# Patient Record
Sex: FEMALE | Race: WHITE | NOT HISPANIC OR LATINO
[De-identification: names, ages, dates, MRNs, and addresses within clinical notes are randomized per-mention and may not be internally consistent; named-entity substitution may affect disease eponyms.]

---

## 2018-02-14 ENCOUNTER — APPOINTMENT (OUTPATIENT)
Dept: GASTROENTEROLOGY | Facility: CLINIC | Age: 68
End: 2018-02-14
Payer: MEDICARE

## 2018-02-14 VITALS
HEIGHT: 60 IN | SYSTOLIC BLOOD PRESSURE: 113 MMHG | RESPIRATION RATE: 16 BRPM | HEART RATE: 78 BPM | WEIGHT: 168 LBS | OXYGEN SATURATION: 97 % | TEMPERATURE: 97.9 F | BODY MASS INDEX: 32.98 KG/M2 | DIASTOLIC BLOOD PRESSURE: 75 MMHG

## 2018-02-14 DIAGNOSIS — Z78.9 OTHER SPECIFIED HEALTH STATUS: ICD-10-CM

## 2018-02-14 DIAGNOSIS — Z82.49 FAMILY HISTORY OF ISCHEMIC HEART DISEASE AND OTHER DISEASES OF THE CIRCULATORY SYSTEM: ICD-10-CM

## 2018-02-14 DIAGNOSIS — F15.90 OTHER STIMULANT USE, UNSPECIFIED, UNCOMPLICATED: ICD-10-CM

## 2018-02-14 DIAGNOSIS — K64.8 OTHER HEMORRHOIDS: ICD-10-CM

## 2018-02-14 DIAGNOSIS — K21.9 GASTRO-ESOPHAGEAL REFLUX DISEASE W/OUT ESOPHAGITIS: ICD-10-CM

## 2018-02-14 DIAGNOSIS — Z87.898 PERSONAL HISTORY OF OTHER SPECIFIED CONDITIONS: ICD-10-CM

## 2018-02-14 DIAGNOSIS — N20.0 CALCULUS OF KIDNEY: ICD-10-CM

## 2018-02-14 DIAGNOSIS — Z80.3 FAMILY HISTORY OF MALIGNANT NEOPLASM OF BREAST: ICD-10-CM

## 2018-02-14 PROCEDURE — 99215 OFFICE O/P EST HI 40 MIN: CPT

## 2018-04-15 PROBLEM — K21.9 GASTROESOPHAGEAL REFLUX DISEASE, ESOPHAGITIS PRESENCE NOT SPECIFIED: Noted: 2018-02-14

## 2018-04-15 PROBLEM — K21.9 GASTROESOPHAGEAL REFLUX DISEASE: Noted: 2018-02-14

## 2018-07-26 ENCOUNTER — RX RENEWAL (OUTPATIENT)
Age: 68
End: 2018-07-26

## 2018-09-05 ENCOUNTER — FORM ENCOUNTER (OUTPATIENT)
Age: 68
End: 2018-09-05

## 2018-09-06 ENCOUNTER — APPOINTMENT (OUTPATIENT)
Dept: ULTRASOUND IMAGING | Facility: CLINIC | Age: 68
End: 2018-09-06
Payer: MEDICARE

## 2018-09-06 ENCOUNTER — OUTPATIENT (OUTPATIENT)
Dept: OUTPATIENT SERVICES | Facility: HOSPITAL | Age: 68
LOS: 1 days | End: 2018-09-06
Payer: MEDICARE

## 2018-09-06 DIAGNOSIS — Z00.8 ENCOUNTER FOR OTHER GENERAL EXAMINATION: ICD-10-CM

## 2018-09-06 PROCEDURE — 76700 US EXAM ABDOM COMPLETE: CPT

## 2018-09-06 PROCEDURE — 76700 US EXAM ABDOM COMPLETE: CPT | Mod: 26

## 2018-10-11 ENCOUNTER — APPOINTMENT (OUTPATIENT)
Dept: GASTROENTEROLOGY | Facility: CLINIC | Age: 68
End: 2018-10-11
Payer: MEDICARE

## 2018-10-11 VITALS
RESPIRATION RATE: 14 BRPM | BODY MASS INDEX: 31.72 KG/M2 | HEART RATE: 85 BPM | WEIGHT: 168 LBS | DIASTOLIC BLOOD PRESSURE: 82 MMHG | HEIGHT: 61 IN | SYSTOLIC BLOOD PRESSURE: 125 MMHG | OXYGEN SATURATION: 96 %

## 2018-10-11 DIAGNOSIS — B18.2 CHRONIC VIRAL HEPATITIS C: ICD-10-CM

## 2018-10-11 DIAGNOSIS — Z87.39 PERSONAL HISTORY OF OTHER DISEASES OF THE MUSCULOSKELETAL SYSTEM AND CONNECTIVE TISSUE: ICD-10-CM

## 2018-10-11 DIAGNOSIS — R77.2 ABNORMALITY OF ALPHAFETOPROTEIN: ICD-10-CM

## 2018-10-11 DIAGNOSIS — K22.10 ULCER OF ESOPHAGUS W/OUT BLEEDING: ICD-10-CM

## 2018-10-11 DIAGNOSIS — Z86.010 PERSONAL HISTORY OF COLONIC POLYPS: ICD-10-CM

## 2018-10-11 DIAGNOSIS — K44.9 DIAPHRAGMATIC HERNIA W/OUT OBSTRUCTION OR GANGRENE: ICD-10-CM

## 2018-10-11 PROCEDURE — 99214 OFFICE O/P EST MOD 30 MIN: CPT

## 2018-10-11 RX ORDER — ASPIRIN 81 MG
81 TABLET,CHEWABLE ORAL
Refills: 0 | Status: ACTIVE | COMMUNITY

## 2018-10-11 RX ORDER — IBUPROFEN 200 MG
600 CAPSULE ORAL DAILY
Refills: 0 | Status: ACTIVE | COMMUNITY

## 2019-01-02 ENCOUNTER — APPOINTMENT (OUTPATIENT)
Dept: GASTROENTEROLOGY | Facility: CLINIC | Age: 69
End: 2019-01-02

## 2019-02-28 ENCOUNTER — APPOINTMENT (OUTPATIENT)
Dept: GASTROENTEROLOGY | Facility: CLINIC | Age: 69
End: 2019-02-28
Payer: MEDICARE

## 2019-02-28 VITALS
HEART RATE: 87 BPM | HEIGHT: 61 IN | WEIGHT: 168 LBS | BODY MASS INDEX: 31.72 KG/M2 | OXYGEN SATURATION: 95 % | TEMPERATURE: 97.9 F | DIASTOLIC BLOOD PRESSURE: 80 MMHG | SYSTOLIC BLOOD PRESSURE: 130 MMHG

## 2019-02-28 DIAGNOSIS — K46.9 UNSPECIFIED ABDOMINAL HERNIA W/OUT OBSTRUCTION OR GANGRENE: ICD-10-CM

## 2019-02-28 DIAGNOSIS — Z86.19 PERSONAL HISTORY OF OTHER INFECTIOUS AND PARASITIC DISEASES: ICD-10-CM

## 2019-02-28 PROCEDURE — 99204 OFFICE O/P NEW MOD 45 MIN: CPT

## 2019-02-28 NOTE — ASSESSMENT
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 Patient will continue to follow with hepatology group at [facility] and I will make further suggestions after above testing is complete the risks benefits alternatives and limitations of endoscopy were discussed

## 2019-04-10 ENCOUNTER — APPOINTMENT (OUTPATIENT)
Dept: GASTROENTEROLOGY | Facility: AMBULATORY MEDICAL SERVICES | Age: 69
End: 2019-04-10
Payer: MEDICARE

## 2019-04-10 PROCEDURE — 43239 EGD BIOPSY SINGLE/MULTIPLE: CPT

## 2019-05-03 ENCOUNTER — APPOINTMENT (OUTPATIENT)
Dept: HEPATOLOGY | Facility: CLINIC | Age: 69
End: 2019-05-03
Payer: MEDICARE

## 2019-05-03 VITALS
SYSTOLIC BLOOD PRESSURE: 135 MMHG | HEIGHT: 61 IN | BODY MASS INDEX: 31.53 KG/M2 | OXYGEN SATURATION: 96 % | WEIGHT: 167 LBS | HEART RATE: 84 BPM | TEMPERATURE: 98 F | DIASTOLIC BLOOD PRESSURE: 79 MMHG

## 2019-05-03 PROCEDURE — 99204 OFFICE O/P NEW MOD 45 MIN: CPT

## 2019-05-03 PROCEDURE — 99214 OFFICE O/P EST MOD 30 MIN: CPT

## 2019-05-03 NOTE — HISTORY OF PRESENT ILLNESS
[Needlestick Exposure] : no needlestick exposure [Infected Sexual Partner] : no infected sexual partner [Tattoo] : no tattoos [IV Drug Use] : no IV drug use [Body Piercing] : no body piercing [Hemodialysis] : no hemodialysis [Transfusion before 1992] : no transfusion before 1992 [Incarceration] : no incarceration [Transplant before 1992] : no transplant before 1992 [Alcohol Abuse] : no alcohol abuse [Autoimmune Disorder] : no autoimmune disorder [Household Contact to HBV] : no household contact to HBV [Travel to Endemic Area] : no travel to an endemic area [Occupational Exposure] : no occupational exposure [Cocaine Use] : no cocaine use [Moderate] : moderate in severity [Improving] : improving [Fatigue] : denies fatigue [Malaise] : denies malaise [Fever] : denies fever [Diffuse Joint Pain (Arthralgias)] : denies arthralgias [Muscle Aches, Generalized (Myalgias)] : denies myalgias [Yellow Skin Or Eyes (Jaundice)] : denies jaundice [Abdominal Pain] : denies abdominal pain [Urine Tests Nonspecific Abnormal Findings Biliuria] : denies dark urine [Light Colored Bowel Movement (Acholic Stools)] : denies pale stools [Insomnia] : denies insomnia [Skin: Rash] : denies rash [Shortness Of Breath] : denies shortness of breath [Itching (Pruritus)] : denies pruritus [Wt Gain ___ Lbs] : no recent weight gain [Wt Loss ___ Lbs] : no recent weight loss [de-identified] : This patient has history of hepatitis C infection diagnosed in 1996.  She was treated with interferon and then a second course of interferon and ribavirin and finally a third course of treatment with Sovaldi and Olysio.  This third course of treatment was successful in eliminating hepatitis C infection.  The patient has had 3 liver biopsies in 2006 2011 and 2013.  In 2006.  The patient had grade 1 stage II, histopathology and in 2011, grade one-to stage one-to.  The results from the biopsy and 2013 are not available.  The patient has had an abdominal MRI and 2013 showing mild increase in spleen size but no liver mass, lesion.  The patient has had an abdominal ultrasound in 2018, showing no focal liver lesion.  The patient is immune to hepatitis A, but is not immune to hepatitis B, but does believe that she has been vaccinated for hepatitis B without an effective response.\par \par The patient does not drink alcohol.  Her weight has been constant, although she is overweight.

## 2019-05-03 NOTE — ASSESSMENT
[FreeTextEntry1] : This patient has history of hepatitis C, which has reportedly been cured on treatment.  I will obtain repeat hepatitis C RNA and laboratory testing to assure absence of hepatitis C and also to assess potential fatty liver.  The patient will have an abdominal CT scan and fiber scan to assess liver morphology as well as assessing degree of hepatic fibrosis.  The patient is claustrophobic and does not wish to have MRI.  I will see the patient again in 6 months.

## 2019-05-03 NOTE — PHYSICAL EXAM
[Scleral Icterus] : No Scleral Icterus [Spider Angioma] : No spider angioma(s) were observed [Hepatojugular Reflux] : patient did not have a sustained hepatojugular reflux [Ascites Fluid Wave] : no ascites fluid wave [Abdominal Bruit] : no abdominal bruit [Abdominal  Ascites] : no ascites [Ascites Tense] : ascites is not tense [Ascites Shifting Dullness] : no shifting dullness of ascites [Caput Medusae] : no caput medusae observed [Liver Size (___ Cm)] : Liver size [unfilled] cm [Splenomegaly] : no splenomegaly [Liver Palpable ___ Finger Breadths Below Costal Margin] : Liver edge was palpable [unfilled] finger breadths below costal margin [Asterixis] : no asterixis observed [Palmar Erythema] : no Palmar Erythema [Jaundice] : No jaundice [General Appearance - Alert] : alert [General Appearance - In No Acute Distress] : in no acute distress [General Appearance - Well Developed] : well developed [Sclera] : the sclera and conjunctiva were normal [Outer Ear] : the ears and nose were normal in appearance [Examination Of The Oral Cavity] : the lips and gums were normal [Neck Appearance] : the appearance of the neck was normal [Neck Cervical Mass (___cm)] : no neck mass was observed [Jugular Venous Distention Increased] : there was no jugular-venous distention [Respiration, Rhythm And Depth] : normal respiratory rhythm and effort [Auscultation Breath Sounds / Voice Sounds] : lungs were clear to auscultation bilaterally [Exaggerated Use Of Accessory Muscles For Inspiration] : no accessory muscle use [Heart Sounds] : normal S1 and S2 [Heart Rate And Rhythm] : heart rate was normal and rhythm regular [Murmurs] : no murmurs [Edema] : there was no peripheral edema [Bowel Sounds] : normal bowel sounds [Abdomen Soft] : soft [Abdomen Tenderness] : non-tender [Abdomen Mass (___ Cm)] : no abdominal mass palpated [FreeTextEntry1] : mild hepatomegaly [Supraclavicular Lymph Nodes Enlarged Bilaterally] : supraclavicular [No CVA Tenderness] : no ~M costovertebral angle tenderness [Nail Clubbing] : no clubbing  or cyanosis of the fingernails [Skin Color & Pigmentation] : normal skin color and pigmentation [Involuntary Movements] : no involuntary movements were seen [No Focal Deficits] : no focal deficits [] : no rash [Skin Turgor] : normal skin turgor [Oriented To Time, Place, And Person] : oriented to person, place, and time

## 2019-05-05 ENCOUNTER — FORM ENCOUNTER (OUTPATIENT)
Age: 69
End: 2019-05-05

## 2019-05-06 ENCOUNTER — OUTPATIENT (OUTPATIENT)
Dept: OUTPATIENT SERVICES | Facility: HOSPITAL | Age: 69
LOS: 1 days | End: 2019-05-06
Payer: MEDICARE

## 2019-05-06 ENCOUNTER — APPOINTMENT (OUTPATIENT)
Dept: CT IMAGING | Facility: CLINIC | Age: 69
End: 2019-05-06
Payer: MEDICARE

## 2019-05-06 DIAGNOSIS — Z00.8 ENCOUNTER FOR OTHER GENERAL EXAMINATION: ICD-10-CM

## 2019-05-06 LAB
AFP-TM SERPL-MCNC: 5.1 NG/ML
ALBUMIN SERPL ELPH-MCNC: 4.8 G/DL
ALP BLD-CCNC: 93 U/L
ALT SERPL-CCNC: 18 U/L
ANION GAP SERPL CALC-SCNC: 15 MMOL/L
AST SERPL-CCNC: 29 U/L
BASOPHILS # BLD AUTO: 0.05 K/UL
BASOPHILS NFR BLD AUTO: 1.1 %
BILIRUB SERPL-MCNC: 0.2 MG/DL
BUN SERPL-MCNC: 25 MG/DL
CALCIUM SERPL-MCNC: 9.6 MG/DL
CHLORIDE SERPL-SCNC: 105 MMOL/L
CHOLEST SERPL-MCNC: 128 MG/DL
CHOLEST/HDLC SERPL: 2.3 RATIO
CO2 SERPL-SCNC: 22 MMOL/L
CREAT SERPL-MCNC: 0.66 MG/DL
EOSINOPHIL # BLD AUTO: 0.11 K/UL
EOSINOPHIL NFR BLD AUTO: 2.5 %
ESTIMATED AVERAGE GLUCOSE: 103 MG/DL
GLUCOSE SERPL-MCNC: 85 MG/DL
HBA1C MFR BLD HPLC: 5.2 %
HCT VFR BLD CALC: 34.6 %
HCV RNA SERPL NAA DL=5-ACNC: NOT DETECTED IU/ML
HCV RNA SERPL NAA+PROBE-LOG IU: NOT DETECTED LOGIU/ML
HDLC SERPL-MCNC: 57 MG/DL
HGB BLD-MCNC: 10.7 G/DL
IMM GRANULOCYTES NFR BLD AUTO: 0.2 %
INR PPP: 0.99 RATIO
LDLC SERPL CALC-MCNC: 57 MG/DL
LYMPHOCYTES # BLD AUTO: 1.47 K/UL
LYMPHOCYTES NFR BLD AUTO: 33.4 %
MAN DIFF?: NORMAL
MCHC RBC-ENTMCNC: 27.8 PG
MCHC RBC-ENTMCNC: 30.9 GM/DL
MCV RBC AUTO: 89.9 FL
MONOCYTES # BLD AUTO: 0.49 K/UL
MONOCYTES NFR BLD AUTO: 11.1 %
NEUTROPHILS # BLD AUTO: 2.27 K/UL
NEUTROPHILS NFR BLD AUTO: 51.7 %
PLATELET # BLD AUTO: 301 K/UL
POTASSIUM SERPL-SCNC: 5 MMOL/L
PROT SERPL-MCNC: 7.3 G/DL
PT BLD: 11.4 SEC
RBC # BLD: 3.85 M/UL
RBC # FLD: 14.9 %
SODIUM SERPL-SCNC: 142 MMOL/L
TRIGL SERPL-MCNC: 70 MG/DL
WBC # FLD AUTO: 4.4 K/UL

## 2019-05-06 PROCEDURE — 74170 CT ABD WO CNTRST FLWD CNTRST: CPT

## 2019-05-06 PROCEDURE — 82565 ASSAY OF CREATININE: CPT

## 2019-05-06 PROCEDURE — 74170 CT ABD WO CNTRST FLWD CNTRST: CPT | Mod: 26

## 2019-05-07 ENCOUNTER — NON-APPOINTMENT (OUTPATIENT)
Age: 69
End: 2019-05-07

## 2019-05-07 ENCOUNTER — APPOINTMENT (OUTPATIENT)
Dept: INTERNAL MEDICINE | Facility: CLINIC | Age: 69
End: 2019-05-07
Payer: MEDICARE

## 2019-05-07 VITALS
DIASTOLIC BLOOD PRESSURE: 82 MMHG | SYSTOLIC BLOOD PRESSURE: 128 MMHG | TEMPERATURE: 98.5 F | HEIGHT: 61 IN | RESPIRATION RATE: 16 BRPM | WEIGHT: 170.19 LBS | BODY MASS INDEX: 32.13 KG/M2 | HEART RATE: 74 BPM | OXYGEN SATURATION: 98 %

## 2019-05-07 DIAGNOSIS — Z82.49 FAMILY HISTORY OF ISCHEMIC HEART DISEASE AND OTHER DISEASES OF THE CIRCULATORY SYSTEM: ICD-10-CM

## 2019-05-07 PROCEDURE — G0439: CPT

## 2019-05-07 PROCEDURE — 93000 ELECTROCARDIOGRAM COMPLETE: CPT

## 2019-05-07 NOTE — PLAN
[FreeTextEntry1] : ENT\par cerumen, right ear-start Debrox\par Endocrinology\par hyperlipidemia-continue Atorvastatin 20mg p.o.q.d. - continue low cholesterol/low fat diet  \par hypertriglyceridemia-continue Omega 3 1000mg BID p.o.q.d.\par obesity-advised low carbohydrate diet/increased CV exercise\par Musculoskeletal\par osteopenia-continue Osteo Bi-Flex tablets p.o.q.d.\par continue Calcium 600mg BID p.o.q.d. \par Gastroenterology\par GERD-continue Dexilant 60mg p.o.q.d. - continue antireflux measures\par Cardiology \par hypertension-continue Losartan 25mg p.o.q.d. - continue low sodium diet\par continue Aspirin 81mg p.o.q.d.\par Rx given for Carotid doppler\par Hematology\par continue vitamin D3 2000 unit capsules BID p.o.q.d.\par Infectious Disease\par Pneumovax/Prevnar-discussed, patient to consider\par \par check EKG\par Patient to follow up in 3 months for fasting blood work. \par \par \par

## 2019-05-07 NOTE — ASSESSMENT
[FreeTextEntry1] : New patient is a 68 year year old female with a past medical history as above who presents for annual physical exam.\par

## 2019-05-07 NOTE — HISTORY OF PRESENT ILLNESS
[Spouse] : spouse [FreeTextEntry1] : new patient annual physical exam  [de-identified] : New patient is a 68 year year old female with a past medical history as below who presents for annual physical exam. Patient states she is taking all medications as prescribed and denies any adverse reactions or side effects. She denies myalgias or arthralgias on Atorvastatin. She denies lightheadedness or dizziness on Losartan. Patient states she sees her gynecologist annually for mammogram/breast sonogram. She states her last colonoscopy was in 2016 with gastroenterologist, Dr. Erazo. She also notes a recent endoscopy with Dr. Erazo. She notes seeing gastroenterologist, Dr. Philip in the past. She states recent bone density testing in 2019 revealed osteopenia. Patient notes past diagnosis of Hepatitis C after the birth of her second child after receiving a blood transfusion.. She states treatment with Harvoni has helped. She also notes a recent CAT scan of her liver with gastroenterologist, Dr. Connolly. Patient notes a hiatal hernia. Patient did not have a flu shot within the past year. Patient has not had a pneumonia shot. \par

## 2019-05-07 NOTE — ADDENDUM
[FreeTextEntry1] : I, Dejon Michele, acted solely as scribe for Dr. Ta Fischer DO on this date 05/07/2019  1:50PM .\par \par All medical record entries made by the Scribe were at my, Dr. Ta Fischer DO direction and personally dictated by me on 05/07/2019  1:50PM. I have reviewed the chart and agree that the record accurately reflects my personal performance of the history, physical exam, assessment and plan. I have also personally directed, reviewed and agreed with the chart.\par

## 2019-05-07 NOTE — PHYSICAL EXAM
[No Acute Distress] : no acute distress [Well-Appearing] : well-appearing [Well Nourished] : well nourished [Well Developed] : well developed [Normal Sclera/Conjunctiva] : normal sclera/conjunctiva [PERRL] : pupils equal round and reactive to light [EOMI] : extraocular movements intact [Normal Outer Ear/Nose] : the outer ears and nose were normal in appearance [Normal Oropharynx] : the oropharynx was normal [No JVD] : no jugular venous distention [Thyroid Normal, No Nodules] : the thyroid was normal and there were no nodules present [Supple] : supple [No Lymphadenopathy] : no lymphadenopathy [No Respiratory Distress] : no respiratory distress  [Clear to Auscultation] : lungs were clear to auscultation bilaterally [Regular Rhythm] : with a regular rhythm [Normal Rate] : normal rate  [No Accessory Muscle Use] : no accessory muscle use [No Murmur] : no murmur heard [Normal S1, S2] : normal S1 and S2 [No Abdominal Bruit] : a ~M bruit was not heard ~T in the abdomen [Pedal Pulses Present] : the pedal pulses are present [No Extremity Clubbing/Cyanosis] : no extremity clubbing/cyanosis [Soft] : abdomen soft [Non Tender] : non-tender [No Palpable Aorta] : no palpable aorta [Non-distended] : non-distended [No Masses] : no abdominal mass palpated [Normal Bowel Sounds] : normal bowel sounds [Normal Posterior Cervical Nodes] : no posterior cervical lymphadenopathy [No HSM] : no HSM [Normal Anterior Cervical Nodes] : no anterior cervical lymphadenopathy [No CVA Tenderness] : no CVA  tenderness [Grossly Normal Strength/Tone] : grossly normal strength/tone [No Spinal Tenderness] : no spinal tenderness [No Joint Swelling] : no joint swelling [No Rash] : no rash [Normal Gait] : normal gait [Deep Tendon Reflexes (DTR)] : deep tendon reflexes were 2+ and symmetric [No Focal Deficits] : no focal deficits [Coordination Grossly Intact] : coordination grossly intact [Normal Insight/Judgement] : insight and judgment were intact [Normal Affect] : the affect was normal [Kyphosis] : kyphosis [Scoliosis] : scoliosis [de-identified] : questionable left carotid bruit; trace edema with varicose veins and spider veins bilaterally [de-identified] : cerumen present in right ear [de-identified] : obese [de-identified] : kyphoscoliosis

## 2019-05-09 DIAGNOSIS — K82.8 OTHER SPECIFIED DISEASES OF GALLBLADDER: ICD-10-CM

## 2019-08-16 ENCOUNTER — APPOINTMENT (OUTPATIENT)
Dept: INTERNAL MEDICINE | Facility: CLINIC | Age: 69
End: 2019-08-16
Payer: MEDICARE

## 2019-08-16 VITALS
HEART RATE: 81 BPM | BODY MASS INDEX: 31.53 KG/M2 | DIASTOLIC BLOOD PRESSURE: 78 MMHG | SYSTOLIC BLOOD PRESSURE: 128 MMHG | WEIGHT: 167 LBS | OXYGEN SATURATION: 98 % | TEMPERATURE: 98.9 F | HEIGHT: 61 IN | RESPIRATION RATE: 16 BRPM

## 2019-08-16 PROCEDURE — 99214 OFFICE O/P EST MOD 30 MIN: CPT | Mod: 25

## 2019-08-16 PROCEDURE — 90670 PCV13 VACCINE IM: CPT

## 2019-08-16 PROCEDURE — 36415 COLL VENOUS BLD VENIPUNCTURE: CPT

## 2019-08-16 PROCEDURE — G0009: CPT

## 2019-08-18 NOTE — PLAN
[FreeTextEntry1] : Cardiology \par hypertension - continue low sodium diet\par palpitations - continue Propranolol HCl 20mg TID p.o. p.r.n. \par continue Aspirin 81mg p.o.q.d. and Ubiquinol 100mg p.o.q.d. \par Endocrinology\par hyperlipidemia - continue Atorvastatin Calcium 20mg p.o.q.d. - continue low cholesterol/low fat diet - check FLP and LFTs\par hypertriglyceridemia - continue Omega 3 1000mg BID p.o.q.d. - check FLP and LFTs\par fatty liver/chronic hepatitis C - check LFTs - follow up liver specialist, Dr. Connolly \par obesity - continue low carbohydrate diet and CV exercise\par continue vitamin D-3 2000 unit capsules BID p.o.q.d. - check vitamin D\par Musculoskeletal\par osteopenia - continue Osteo Bi-Flex tablets p.o.q.d. and Calcium 600mg BID p.o.q.d. \par Gastroenterology\par GERD - continue Dexilant 60mg p.o.q.d. - continue antireflux measures\par Will request results of last screening colonoscopy from gastroenterologist, Dr. Philip\par Gynecology\par Will request results of last mammogram from [facility]\par Infectious Disease\par Prevnar 13 - 0.5mL x 1 administered intramuscularly \par \par check female panel \par Patient to follow up in 3 months for fasting blood work.

## 2019-08-18 NOTE — PHYSICAL EXAM
[No Acute Distress] : no acute distress [Well Developed] : well developed [Well Nourished] : well nourished [Well-Appearing] : well-appearing [Normal Sclera/Conjunctiva] : normal sclera/conjunctiva [PERRL] : pupils equal round and reactive to light [EOMI] : extraocular movements intact [Normal Outer Ear/Nose] : the outer ears and nose were normal in appearance [No JVD] : no jugular venous distention [Normal Oropharynx] : the oropharynx was normal [No Lymphadenopathy] : no lymphadenopathy [Thyroid Normal, No Nodules] : the thyroid was normal and there were no nodules present [Supple] : supple [No Accessory Muscle Use] : no accessory muscle use [No Respiratory Distress] : no respiratory distress  [Normal Rate] : normal rate  [Clear to Auscultation] : lungs were clear to auscultation bilaterally [Regular Rhythm] : with a regular rhythm [Normal S1, S2] : normal S1 and S2 [No Murmur] : no murmur heard [No Carotid Bruits] : no carotid bruits [No Abdominal Bruit] : a ~M bruit was not heard ~T in the abdomen [No Varicosities] : no varicosities [Pedal Pulses Present] : the pedal pulses are present [No Edema] : there was no peripheral edema [No Palpable Aorta] : no palpable aorta [No Extremity Clubbing/Cyanosis] : no extremity clubbing/cyanosis [Soft] : abdomen soft [Non-distended] : non-distended [Non Tender] : non-tender [No Masses] : no abdominal mass palpated [Normal Bowel Sounds] : normal bowel sounds [No HSM] : no HSM [Normal Anterior Cervical Nodes] : no anterior cervical lymphadenopathy [Normal Posterior Cervical Nodes] : no posterior cervical lymphadenopathy [No CVA Tenderness] : no CVA  tenderness [No Spinal Tenderness] : no spinal tenderness [No Joint Swelling] : no joint swelling [No Rash] : no rash [Grossly Normal Strength/Tone] : grossly normal strength/tone [Coordination Grossly Intact] : coordination grossly intact [Normal Gait] : normal gait [No Focal Deficits] : no focal deficits [Normal Affect] : the affect was normal [Deep Tendon Reflexes (DTR)] : deep tendon reflexes were 2+ and symmetric [Normal Insight/Judgement] : insight and judgment were intact [de-identified] : obese

## 2019-08-18 NOTE — ASSESSMENT
[FreeTextEntry1] : Patient is a 68 year old female with a past medical history as above who presents for fasting blood work and general follow-up.\par

## 2019-08-18 NOTE — ADDENDUM
[FreeTextEntry1] : I, Dejon Michele, acted solely as scribe for Dr. Ta Fischer DO on this date 08/16/2019 10:30AM .\par \par All medical record entries made by the Scribe were at my, Dr. Ta Fischer DO direction and personally dictated by me on 08/16/2019 10:30AM. I have reviewed the chart and agree that the record accurately reflects my personal performance of the history, physical exam, assessment and plan. I have also personally directed, reviewed and agreed with the chart.\par

## 2019-08-18 NOTE — HISTORY OF PRESENT ILLNESS
[Spouse] : spouse [FreeTextEntry1] : fasting blood work and general follow-up\par  [de-identified] : Patient is a 68 year old female with a past medical history as below who presents for fasting blood work and general follow-up. Patient states she is taking all medications as prescribed and denies any adverse reactions or side effects. She notes past imaging per cardiologist, Dr. Wilhelm did not reveal any blockage of the carotid arteries. Patient notes recently being started on Propranolol by Dr. Wilhelm for palpitations. She states she has an appointment scheduled with liver specialist, Dr. Connolly in November given history of chronic hepatitis C and fatty liver. Last colonoscopy was 1 year ago with Dr. Philip. She also notes having an endoscopy in the past year. Patient states last mammogram was at [facility].

## 2019-08-27 LAB
25(OH)D3 SERPL-MCNC: 50.2 NG/ML
ALBUMIN SERPL ELPH-MCNC: 5 G/DL
ALP BLD-CCNC: 86 U/L
ALT SERPL-CCNC: 14 U/L
ANION GAP SERPL CALC-SCNC: 12 MMOL/L
AST SERPL-CCNC: 19 U/L
BASOPHILS # BLD AUTO: 0.03 K/UL
BASOPHILS NFR BLD AUTO: 0.8 %
BILIRUB SERPL-MCNC: 0.4 MG/DL
BUN SERPL-MCNC: 17 MG/DL
CALCIUM SERPL-MCNC: 9.8 MG/DL
CHLORIDE SERPL-SCNC: 106 MMOL/L
CHOLEST SERPL-MCNC: 122 MG/DL
CHOLEST/HDLC SERPL: 2.4 RATIO
CO2 SERPL-SCNC: 25 MMOL/L
CREAT SERPL-MCNC: 0.7 MG/DL
EOSINOPHIL # BLD AUTO: 0.1 K/UL
EOSINOPHIL NFR BLD AUTO: 2.7 %
ESTIMATED AVERAGE GLUCOSE: 103 MG/DL
GLUCOSE SERPL-MCNC: 92 MG/DL
HBA1C MFR BLD HPLC: 5.2 %
HCT VFR BLD CALC: 36.1 %
HDLC SERPL-MCNC: 51 MG/DL
HGB BLD-MCNC: 10.9 G/DL
IMM GRANULOCYTES NFR BLD AUTO: 0.3 %
LDLC SERPL CALC-MCNC: 49 MG/DL
LYMPHOCYTES # BLD AUTO: 1.07 K/UL
LYMPHOCYTES NFR BLD AUTO: 29.3 %
MAN DIFF?: NORMAL
MCHC RBC-ENTMCNC: 27.8 PG
MCHC RBC-ENTMCNC: 30.2 GM/DL
MCV RBC AUTO: 92.1 FL
MONOCYTES # BLD AUTO: 0.35 K/UL
MONOCYTES NFR BLD AUTO: 9.6 %
NEUTROPHILS # BLD AUTO: 2.09 K/UL
NEUTROPHILS NFR BLD AUTO: 57.3 %
PLATELET # BLD AUTO: 273 K/UL
POTASSIUM SERPL-SCNC: 4.4 MMOL/L
PROT SERPL-MCNC: 7.5 G/DL
RBC # BLD: 3.92 M/UL
RBC # FLD: 15.4 %
SODIUM SERPL-SCNC: 143 MMOL/L
TRIGL SERPL-MCNC: 109 MG/DL
TSH SERPL-ACNC: 1.48 UIU/ML
WBC # FLD AUTO: 3.65 K/UL

## 2019-11-13 ENCOUNTER — RX RENEWAL (OUTPATIENT)
Age: 69
End: 2019-11-13

## 2019-12-02 ENCOUNTER — APPOINTMENT (OUTPATIENT)
Dept: INTERNAL MEDICINE | Facility: CLINIC | Age: 69
End: 2019-12-02
Payer: MEDICARE

## 2019-12-02 VITALS
HEART RATE: 77 BPM | OXYGEN SATURATION: 98 % | WEIGHT: 164.13 LBS | HEIGHT: 61 IN | TEMPERATURE: 97.8 F | DIASTOLIC BLOOD PRESSURE: 70 MMHG | RESPIRATION RATE: 16 BRPM | BODY MASS INDEX: 30.99 KG/M2 | SYSTOLIC BLOOD PRESSURE: 118 MMHG

## 2019-12-02 PROCEDURE — 99214 OFFICE O/P EST MOD 30 MIN: CPT | Mod: 25

## 2019-12-02 PROCEDURE — G0008: CPT

## 2019-12-02 PROCEDURE — 90662 IIV NO PRSV INCREASED AG IM: CPT

## 2019-12-02 NOTE — PLAN
[FreeTextEntry1] : Cardiology \par hypertension - continue low sodium diet\par continue Aspirin 81mg p.o.q.d. and Ubiquinol 100mg p.o.q.d. \par Endocrinology\par hyperlipidemia - continue Atorvastatin Calcium 20mg p.o.q.d. - continue low cholesterol/low fat diet - check FLP and LFTs\par hypertriglyceridemia - continue Atorvastatin Calcium 20mg p.o.q.d. - continue low cholesterol/low fat and low carbohydrate/low sugar diet - check FLP and LFTs\par fatty liver/chronic hepatitis C - check LFTs and AFP - continue to follow up with liver specialist, Dr. Connolly \par obesity - continue low carbohydrate diet and CV exercise\par continue vitamin D-3 5000 unit capsules BID p.o.q.d. with meals - check vitamin D\par Hematology\par anemia - check CBC\par Musculoskeletal\par osteopenia - continue Osteo Bi-Flex tablets p.o.q.d. and Calcium 600mg BID p.o.q.d. \par Gastroenterology\par GERD/Castillo's esophagus - continue Dexilant 60mg p.o.q.d. - continue antireflux measures\par Infectious Disease\par flu vaccine - Fluzone High-Dose 0.5mL x 1 administered intramuscularly\par Pneumovax 23 - vaccine to be administered in 1 year\par \par Rx given for blood work (female panel, CRP, vitamin B12, and AFP).

## 2019-12-02 NOTE — PHYSICAL EXAM
[No Acute Distress] : no acute distress [Well Nourished] : well nourished [Well Developed] : well developed [Normal Sclera/Conjunctiva] : normal sclera/conjunctiva [PERRL] : pupils equal round and reactive to light [Well-Appearing] : well-appearing [EOMI] : extraocular movements intact [Normal Outer Ear/Nose] : the outer ears and nose were normal in appearance [No JVD] : no jugular venous distention [Normal Oropharynx] : the oropharynx was normal [Supple] : supple [No Lymphadenopathy] : no lymphadenopathy [No Respiratory Distress] : no respiratory distress  [Thyroid Normal, No Nodules] : the thyroid was normal and there were no nodules present [No Accessory Muscle Use] : no accessory muscle use [Clear to Auscultation] : lungs were clear to auscultation bilaterally [Normal Rate] : normal rate  [Regular Rhythm] : with a regular rhythm [Normal S1, S2] : normal S1 and S2 [No Murmur] : no murmur heard [No Abdominal Bruit] : a ~M bruit was not heard ~T in the abdomen [No Carotid Bruits] : no carotid bruits [Pedal Pulses Present] : the pedal pulses are present [No Varicosities] : no varicosities [No Edema] : there was no peripheral edema [No Palpable Aorta] : no palpable aorta [No Extremity Clubbing/Cyanosis] : no extremity clubbing/cyanosis [Soft] : abdomen soft [Non-distended] : non-distended [No Masses] : no abdominal mass palpated [Non Tender] : non-tender [No HSM] : no HSM [Normal Posterior Cervical Nodes] : no posterior cervical lymphadenopathy [Normal Bowel Sounds] : normal bowel sounds [Normal Anterior Cervical Nodes] : no anterior cervical lymphadenopathy [No CVA Tenderness] : no CVA  tenderness [No Spinal Tenderness] : no spinal tenderness [No Joint Swelling] : no joint swelling [Grossly Normal Strength/Tone] : grossly normal strength/tone [No Rash] : no rash [Coordination Grossly Intact] : coordination grossly intact [No Focal Deficits] : no focal deficits [Normal Gait] : normal gait [Deep Tendon Reflexes (DTR)] : deep tendon reflexes were 2+ and symmetric [Normal Affect] : the affect was normal [Normal Insight/Judgement] : insight and judgment were intact [de-identified] : obese

## 2019-12-02 NOTE — ADDENDUM
[FreeTextEntry1] : I, Dejon Michele, acted solely as scribe for Dr. Ta Fischer DO on this date 12/02/2019 12:30PM .\par \par All medical record entries made by the Scribe were at my, Dr. Ta Fischer DO direction and personally dictated by me on 12/02/2019 12:30PM. I have reviewed the chart and agree that the record accurately reflects my personal performance of the history, physical exam, assessment and plan. I have also personally directed, reviewed and agreed with the chart.\par

## 2019-12-02 NOTE — HISTORY OF PRESENT ILLNESS
[Spouse] : spouse [FreeTextEntry1] : fasting blood work and general follow-up\par  [de-identified] : Patient is a 68 year old female with a past medical history as below who presents for fasting blood work and general follow-up. Patient states she is taking all medications as prescribed and denies any adverse reactions or side effects. She denies any new arthralgias or myalgias on Atorvastatin. GERD is well-managed on Dexilant. Patient regularly follows up with liver specialist, Dr. Connolly given history of chronic Hepatitis C. Patient requests additional testing with blood work today (CRP, vitamin B12, and AFP). She inquires about receiving the flu vaccine today. Patient requests Rx for blood work as she is not fasting today.

## 2019-12-11 ENCOUNTER — APPOINTMENT (OUTPATIENT)
Dept: HEPATOLOGY | Facility: CLINIC | Age: 69
End: 2019-12-11
Payer: MEDICARE

## 2019-12-11 VITALS
HEIGHT: 61 IN | SYSTOLIC BLOOD PRESSURE: 137 MMHG | HEART RATE: 66 BPM | BODY MASS INDEX: 30.96 KG/M2 | OXYGEN SATURATION: 97 % | WEIGHT: 164 LBS | DIASTOLIC BLOOD PRESSURE: 77 MMHG

## 2019-12-11 DIAGNOSIS — D13.5 BENIGN NEOPLASM OF EXTRAHEPATIC BILE DUCTS: ICD-10-CM

## 2019-12-11 PROCEDURE — 99213 OFFICE O/P EST LOW 20 MIN: CPT

## 2019-12-11 RX ORDER — DEXLANSOPRAZOLE 60 MG/1
60 CAPSULE, DELAYED RELEASE ORAL DAILY
Qty: 90 | Refills: 3 | Status: DISCONTINUED | COMMUNITY
Start: 2018-02-14 | End: 2019-12-11

## 2019-12-11 RX ORDER — ATORVASTATIN CALCIUM 20 MG/1
20 TABLET, FILM COATED ORAL
Refills: 0 | Status: DISCONTINUED | COMMUNITY
End: 2019-12-11

## 2019-12-11 NOTE — PHYSICAL EXAM
[Scleral Icterus] : No Scleral Icterus [Hepatojugular Reflux] : patient did not have a sustained hepatojugular reflux [Spider Angioma] : No spider angioma(s) were observed [Abdominal Bruit] : no abdominal bruit [Abdominal  Ascites] : no ascites [Ascites Fluid Wave] : no ascites fluid wave [Ascites Tense] : ascites is not tense [Ascites Shifting Dullness] : no shifting dullness of ascites [Splenomegaly] : no splenomegaly [Caput Medusae] : no caput medusae observed [Liver Size (___ Cm)] : Liver size [unfilled] cm [Asterixis] : no asterixis observed [Liver Palpable ___ Finger Breadths Below Costal Margin] : Liver edge was palpable [unfilled] finger breadths below costal margin [Jaundice] : No jaundice [Palmar Erythema] : no Palmar Erythema [General Appearance - Alert] : alert [General Appearance - In No Acute Distress] : in no acute distress [General Appearance - Well Developed] : well developed [Sclera] : the sclera and conjunctiva were normal [Outer Ear] : the ears and nose were normal in appearance [Examination Of The Oral Cavity] : the lips and gums were normal [Neck Appearance] : the appearance of the neck was normal [Neck Cervical Mass (___cm)] : no neck mass was observed [Jugular Venous Distention Increased] : there was no jugular-venous distention [Respiration, Rhythm And Depth] : normal respiratory rhythm and effort [Exaggerated Use Of Accessory Muscles For Inspiration] : no accessory muscle use [Auscultation Breath Sounds / Voice Sounds] : lungs were clear to auscultation bilaterally [Heart Rate And Rhythm] : heart rate was normal and rhythm regular [Murmurs] : no murmurs [Heart Sounds] : normal S1 and S2 [Edema] : there was no peripheral edema [Bowel Sounds] : normal bowel sounds [Abdomen Soft] : soft [Abdomen Tenderness] : non-tender [Abdomen Mass (___ Cm)] : no abdominal mass palpated [Supraclavicular Lymph Nodes Enlarged Bilaterally] : supraclavicular [FreeTextEntry1] : mild hepatomegaly [No CVA Tenderness] : no ~M costovertebral angle tenderness [Nail Clubbing] : no clubbing  or cyanosis of the fingernails [Skin Color & Pigmentation] : normal skin color and pigmentation [Involuntary Movements] : no involuntary movements were seen [Skin Turgor] : normal skin turgor [] : no rash [No Focal Deficits] : no focal deficits [Oriented To Time, Place, And Person] : oriented to person, place, and time

## 2019-12-11 NOTE — HISTORY OF PRESENT ILLNESS
[Needlestick Exposure] : no needlestick exposure [Infected Sexual Partner] : no infected sexual partner [IV Drug Use] : no IV drug use [Tattoo] : no tattoos [Body Piercing] : no body piercing [Hemodialysis] : no hemodialysis [Incarceration] : no incarceration [Transplant before 1992] : no transplant before 1992 [Transfusion before 1992] : no transfusion before 1992 [Alcohol Abuse] : no alcohol abuse [Autoimmune Disorder] : no autoimmune disorder [Household Contact to HBV] : no household contact to HBV [Travel to Endemic Area] : no travel to an endemic area [Cocaine Use] : no cocaine use [Occupational Exposure] : no occupational exposure [Moderate] : moderate in severity [Unchanged] : unchanged [Malaise] : denies malaise [Fatigue] : denies fatigue [Fever] : denies fever [Muscle Aches, Generalized (Myalgias)] : denies myalgias [Diffuse Joint Pain (Arthralgias)] : denies arthralgias [Yellow Skin Or Eyes (Jaundice)] : denies jaundice [Abdominal Pain] : denies abdominal pain [Light Colored Bowel Movement (Acholic Stools)] : denies pale stools [Urine Tests Nonspecific Abnormal Findings Biliuria] : denies dark urine [Insomnia] : denies insomnia [Skin: Rash] : denies rash [Itching (Pruritus)] : denies pruritus [Shortness Of Breath] : denies shortness of breath [Wt Gain ___ Lbs] : no recent weight gain [Wt Loss ___ Lbs] : no recent weight loss [de-identified] : This patient comes for followup of liver abnormalities following hepatitis C. infection.  The patient has a history of having been treated with Sovaldi and Olysio with sustained viral response of her hepatitis C.  HCVRNA is negative.  Total antibody remains positive.  The patient has been tested and found to BE immune to hepatitis A.  The patient is not a hepatitis B however, reports having been vaccinated against this virus without an effective response.\par All biochemical liver tests obtained in December of 2019 are normal.  ALT 12, AST 18, alkaline phosphatase 68, bilirubin 0.3.  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

## 2019-12-11 NOTE — ASSESSMENT
[FreeTextEntry1] : This patient has history of chronic hepatitis C infection, which has been resolved with antiviral therapy.  The patient has had ultrasound Elastography, which showed no significant liver fibrosis.  Abdominal CT scan also showed no evidence of fibrosis.  The patient is immune to hepatitis A and was vaccinated against hepatitis B, but did not achieve immunity.  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  Followup with symptom assessment and followup abdominal ultrasound will be pursued.  The patient is advised in the future to follow with her primary care physician or gastroenterologist for further followup of possible development of gallbladder symptoms.

## 2020-01-10 ENCOUNTER — APPOINTMENT (OUTPATIENT)
Dept: ULTRASOUND IMAGING | Facility: CLINIC | Age: 70
End: 2020-01-10

## 2020-06-24 ENCOUNTER — APPOINTMENT (OUTPATIENT)
Dept: INTERNAL MEDICINE | Facility: CLINIC | Age: 70
End: 2020-06-24
Payer: MEDICARE

## 2020-06-24 VITALS
HEIGHT: 61 IN | DIASTOLIC BLOOD PRESSURE: 70 MMHG | TEMPERATURE: 98.2 F | HEART RATE: 70 BPM | OXYGEN SATURATION: 97 % | BODY MASS INDEX: 31.91 KG/M2 | SYSTOLIC BLOOD PRESSURE: 120 MMHG | WEIGHT: 169 LBS | RESPIRATION RATE: 16 BRPM

## 2020-06-24 PROCEDURE — 99214 OFFICE O/P EST MOD 30 MIN: CPT | Mod: 25

## 2020-06-24 PROCEDURE — 36415 COLL VENOUS BLD VENIPUNCTURE: CPT

## 2020-06-24 RX ORDER — PROPRANOLOL HYDROCHLORIDE 20 MG/1
20 TABLET ORAL DAILY
Refills: 0 | Status: ACTIVE | COMMUNITY

## 2020-06-24 RX ORDER — ASCORBIC ACID 500 MG
TABLET ORAL
Refills: 0 | Status: DISCONTINUED | COMMUNITY

## 2020-06-24 RX ORDER — BLOOD SUGAR DIAGNOSTIC
100 STRIP MISCELLANEOUS
Refills: 0 | Status: ACTIVE | COMMUNITY

## 2020-06-24 NOTE — ASSESSMENT
[FreeTextEntry1] : Patient is a 69 year old female with a past medical history as above who presents for fasting blood work and general follow-up.\par

## 2020-06-24 NOTE — PLAN
[FreeTextEntry1] : Cardiology \par hypertension - continue low sodium diet\par continue Propranolol HCl 20mg p.o. as directed by cardiologist, Dr. Wilhelm (prn palpitations/tachycardia)\par hyperlipidemia - continue Atorvastatin Calcium 20mg p.o.q.d. - continue low cholesterol/low fat diet - check FLP and LFTs\par continue Aspirin 81mg p.o.q.d. and Ubiquinol 100mg p.o.q.d. \par Endocrinology\par obesity - continue low carbohydrate diet and CV exercise\par continue vitamin D-3 p.o. with meals as directed - check vitamin D\par Gastroenterology\par fatty liver/chronic hepatitis C - check LFTs - continue to follow up with liver specialist, Dr. Connolly, hepatitis C in remission\par GERD/Castillo's esophagus - continue Dexilant 60mg p.o.q.d. - continue antireflux measures\par Hematology\par anemia - check CBC\par Musculoskeletal\par osteopenia - continue Osteo Bi-Flex tablets p.o.q.d. and Calcium 600mg BID p.o.q.d. \par \par check female panel and CRP (cardiac and serum)

## 2020-06-24 NOTE — PHYSICAL EXAM
[Well Developed] : well developed [Well Nourished] : well nourished [No Acute Distress] : no acute distress [Well-Appearing] : well-appearing [EOMI] : extraocular movements intact [Normal Sclera/Conjunctiva] : normal sclera/conjunctiva [PERRL] : pupils equal round and reactive to light [Normal Outer Ear/Nose] : the outer ears and nose were normal in appearance [Normal Oropharynx] : the oropharynx was normal [No JVD] : no jugular venous distention [No Lymphadenopathy] : no lymphadenopathy [Thyroid Normal, No Nodules] : the thyroid was normal and there were no nodules present [Supple] : supple [No Respiratory Distress] : no respiratory distress  [No Accessory Muscle Use] : no accessory muscle use [Clear to Auscultation] : lungs were clear to auscultation bilaterally [Normal Rate] : normal rate  [No Murmur] : no murmur heard [Normal S1, S2] : normal S1 and S2 [Regular Rhythm] : with a regular rhythm [No Abdominal Bruit] : a ~M bruit was not heard ~T in the abdomen [No Carotid Bruits] : no carotid bruits [No Edema] : there was no peripheral edema [Pedal Pulses Present] : the pedal pulses are present [No Palpable Aorta] : no palpable aorta [No Extremity Clubbing/Cyanosis] : no extremity clubbing/cyanosis [Soft] : abdomen soft [Non-distended] : non-distended [Non Tender] : non-tender [No Masses] : no abdominal mass palpated [Normal Bowel Sounds] : normal bowel sounds [No HSM] : no HSM [Normal Posterior Cervical Nodes] : no posterior cervical lymphadenopathy [Normal Anterior Cervical Nodes] : no anterior cervical lymphadenopathy [No CVA Tenderness] : no CVA  tenderness [No Spinal Tenderness] : no spinal tenderness [Grossly Normal Strength/Tone] : grossly normal strength/tone [No Joint Swelling] : no joint swelling [No Focal Deficits] : no focal deficits [Coordination Grossly Intact] : coordination grossly intact [No Rash] : no rash [Deep Tendon Reflexes (DTR)] : deep tendon reflexes were 2+ and symmetric [Normal Affect] : the affect was normal [Normal Gait] : normal gait [Normal Insight/Judgement] : insight and judgment were intact [de-identified] : obese [de-identified] : spider veins in lower extremities bilaterally

## 2020-06-24 NOTE — ADDENDUM
[FreeTextEntry1] : I, Dejon Michele, acted solely as scribe for Dr. Ta Fischer DO on this date 06/24/2020  8:20AM .\par \par All medical record entries made by the Scribe were at my, Dr. Ta Fischer DO direction and personally dictated by me on 06/24/2020  8:20AM. I have reviewed the chart and agree that the record accurately reflects my personal performance of the history, physical exam, assessment and plan. I have also personally directed, reviewed and agreed with the chart.\par

## 2020-06-25 LAB
25(OH)D3 SERPL-MCNC: 48.2 NG/ML
ALBUMIN SERPL ELPH-MCNC: 4.5 G/DL
ALP BLD-CCNC: 68 U/L
ALT SERPL-CCNC: 9 U/L
ANION GAP SERPL CALC-SCNC: 11 MMOL/L
AST SERPL-CCNC: 16 U/L
BASOPHILS # BLD AUTO: 0.03 K/UL
BASOPHILS NFR BLD AUTO: 0.8 %
BILIRUB SERPL-MCNC: 0.3 MG/DL
BUN SERPL-MCNC: 19 MG/DL
CALCIUM SERPL-MCNC: 9.7 MG/DL
CHLORIDE SERPL-SCNC: 106 MMOL/L
CHOLEST SERPL-MCNC: 126 MG/DL
CHOLEST/HDLC SERPL: 2.3 RATIO
CO2 SERPL-SCNC: 24 MMOL/L
CREAT SERPL-MCNC: 0.72 MG/DL
CRP SERPL HS-MCNC: 0.64 MG/L
CRP SERPL-MCNC: <0.1 MG/DL
EOSINOPHIL # BLD AUTO: 0.05 K/UL
EOSINOPHIL NFR BLD AUTO: 1.3 %
ESTIMATED AVERAGE GLUCOSE: 105 MG/DL
GLUCOSE SERPL-MCNC: 95 MG/DL
HBA1C MFR BLD HPLC: 5.3 %
HCT VFR BLD CALC: 34 %
HDLC SERPL-MCNC: 55 MG/DL
HGB BLD-MCNC: 9.8 G/DL
IMM GRANULOCYTES NFR BLD AUTO: 0.3 %
LDLC SERPL CALC-MCNC: 53 MG/DL
LYMPHOCYTES # BLD AUTO: 1.38 K/UL
LYMPHOCYTES NFR BLD AUTO: 36.8 %
MAN DIFF?: NORMAL
MCHC RBC-ENTMCNC: 25.8 PG
MCHC RBC-ENTMCNC: 28.8 GM/DL
MCV RBC AUTO: 89.5 FL
MONOCYTES # BLD AUTO: 0.42 K/UL
MONOCYTES NFR BLD AUTO: 11.2 %
NEUTROPHILS # BLD AUTO: 1.86 K/UL
NEUTROPHILS NFR BLD AUTO: 49.6 %
PLATELET # BLD AUTO: 305 K/UL
POTASSIUM SERPL-SCNC: 4.2 MMOL/L
PROT SERPL-MCNC: 7.1 G/DL
RBC # BLD: 3.8 M/UL
RBC # FLD: 16.4 %
SODIUM SERPL-SCNC: 142 MMOL/L
TRIGL SERPL-MCNC: 93 MG/DL
TSH SERPL-ACNC: 1.48 UIU/ML
WBC # FLD AUTO: 3.75 K/UL

## 2020-07-12 ENCOUNTER — EMERGENCY (EMERGENCY)
Facility: HOSPITAL | Age: 70
LOS: 1 days | Discharge: HOME | End: 2020-07-12
Attending: STUDENT IN AN ORGANIZED HEALTH CARE EDUCATION/TRAINING PROGRAM | Admitting: EMERGENCY MEDICINE
Payer: MEDICARE

## 2020-07-12 VITALS
RESPIRATION RATE: 18 BRPM | WEIGHT: 167.99 LBS | TEMPERATURE: 98 F | SYSTOLIC BLOOD PRESSURE: 130 MMHG | HEART RATE: 86 BPM | HEIGHT: 61 IN | OXYGEN SATURATION: 97 % | DIASTOLIC BLOOD PRESSURE: 78 MMHG

## 2020-07-12 VITALS
DIASTOLIC BLOOD PRESSURE: 79 MMHG | HEART RATE: 93 BPM | RESPIRATION RATE: 18 BRPM | TEMPERATURE: 99 F | SYSTOLIC BLOOD PRESSURE: 126 MMHG | OXYGEN SATURATION: 95 %

## 2020-07-12 PROCEDURE — 73030 X-RAY EXAM OF SHOULDER: CPT

## 2020-07-12 PROCEDURE — 73060 X-RAY EXAM OF HUMERUS: CPT

## 2020-07-12 PROCEDURE — 99284 EMERGENCY DEPT VISIT MOD MDM: CPT | Mod: 25

## 2020-07-12 PROCEDURE — 73060 X-RAY EXAM OF HUMERUS: CPT | Mod: 26,LT

## 2020-07-12 PROCEDURE — 73030 X-RAY EXAM OF SHOULDER: CPT | Mod: 26,LT

## 2020-07-12 PROCEDURE — 73200 CT UPPER EXTREMITY W/O DYE: CPT | Mod: 26,LT

## 2020-07-12 PROCEDURE — 73200 CT UPPER EXTREMITY W/O DYE: CPT

## 2020-07-12 PROCEDURE — 99284 EMERGENCY DEPT VISIT MOD MDM: CPT

## 2020-07-12 RX ORDER — DEXLANSOPRAZOLE 30 MG/1
1 CAPSULE, DELAYED RELEASE ORAL
Qty: 0 | Refills: 0 | DISCHARGE

## 2020-07-12 RX ORDER — ATENOLOL 25 MG/1
1 TABLET ORAL
Qty: 0 | Refills: 0 | DISCHARGE

## 2020-07-12 RX ORDER — ATORVASTATIN CALCIUM 80 MG/1
1 TABLET, FILM COATED ORAL
Qty: 0 | Refills: 0 | DISCHARGE

## 2020-07-12 RX ORDER — OXYCODONE AND ACETAMINOPHEN 5; 325 MG/1; MG/1
1 TABLET ORAL ONCE
Refills: 0 | Status: DISCONTINUED | OUTPATIENT
Start: 2020-07-12 | End: 2020-07-12

## 2020-07-12 RX ORDER — LOSARTAN POTASSIUM 100 MG/1
1 TABLET, FILM COATED ORAL
Qty: 0 | Refills: 0 | DISCHARGE

## 2020-07-12 RX ADMIN — OXYCODONE AND ACETAMINOPHEN 1 TABLET(S): 5; 325 TABLET ORAL at 17:45

## 2020-07-12 NOTE — ED PROVIDER NOTE - PATIENT PORTAL LINK FT
You can access the FollowMyHealth Patient Portal offered by [facility] by registering at the following website: [email or web address]. By joining [facility]’s FollowMyHealth portal, you will also be able to view your health information using other applications (apps) compatible with our system.

## 2020-07-12 NOTE — ED PROVIDER NOTE - CARE PROVIDER_API CALL
Olayinka Up  ORTHOPAEDIC SURGERY  [address] [place] [address]  Phone: [phone]  Fax: [phone]  Follow Up Time: 1-3 Days Olayinka Up  ORTHOPAEDIC SURGERY  [address] [place] [address]  Phone: [phone]  Fax: [phone]  Follow Up Time: 1-3 Days    Bernardino Lujan  ORTHOPAEDIC SURGERY  [address] [place] [id number]  Phone: [phone]  Fax: [phone]  Follow Up Time:

## 2020-07-12 NOTE — ED PROVIDER NOTE - ATTENDING CONTRIBUTION TO CARE
I have personally performed a face to face diagnostic evaluation on this patient.  I have reviewed the PA note and agree with the history, exam, and plan of care, except as noted.    68s F with left proximal humerus fx s/p mechanical fall.    On exam:  Gen: patient comfortable  Ext: Distal extremity neurovascularly intact, radial, ulnar, median nerve strength 5/5, sensation intact.    Xr with comminuted fx, ct requested by ortho.

## 2020-07-12 NOTE — ED PROVIDER NOTE - PHYSICAL EXAMINATION
SPINE: c-spine: supple, no spinal tenderness. no midline tenderness. no paraspinal tenderness. no body step off. no deformity. no muscle spasm. FROM  Thoracic spine: no spinal tenderness. no midline tenderness. no paraspinal tenderness. no body step off. no deformity. no muscle spasm.FROM   LS-spine:no spinal tenderness. no midline tenderness. no paraspinal tenderness. no body step off. no deformity.no muscle spasm. FROM gait intact     ms lue: left shoulder with deformity ttp extending to midhumerus. elbow nontender. rest of extremity nontender. unable to rom sensation intact distal pulses intact + 2 radial

## 2020-07-12 NOTE — ED PROVIDER NOTE - PROVIDER TOKENS
PROVIDER:[TOKEN:[[id number]],FOLLOWUP:[1-3 Days]] PROVIDER:[TOKEN:[[id number]],FOLLOWUP:[1-3 Days]],PROVIDER:[TOKEN:[[id number]]]

## 2020-07-12 NOTE — ED PROVIDER NOTE - PROGRESS NOTE DETAILS
pa note: ortho consulted resident advised ct shoulder and dc with ortho follow up. attending to follow up pa note: ortho consulted resident aamir advised ct shoulder and re-eval with ortho follow up. attending to follow up

## 2020-07-12 NOTE — ED PROVIDER NOTE - NSFOLLOWUPINSTRUCTIONS_ED_ALL_ED_FT
Fracture    A fracture is a break in one of your bones. This can occur from a variety of injuries, especially traumatic ones. Symptoms include pain, bruising, or swelling. Do not use the injured limb. If a fracture is in one of the bones below your waist, do not put weight on that limb unless instructed to do so by your healthcare provider. Crutches or a cane may have been provided. A splint or cast may have been applied by your health care provider. Make sure to keep it dry and follow up with an orthopedist as instructed.    SEEK IMMEDIATE MEDICAL CARE IF YOU HAVE ANY OF THE FOLLOWING SYMPTOMS: numbness, tingling, increasing pain, or weakness in any part of the injured limb.     1) Follow-up with Orthopedics, See referred doctor. Call today / next business day for close, prompt follow-up.  2) Return to Emergency room for any worsening or persistent pain, weakness, numbness, fever, color change to extremity, or any other concerning symptoms.  3) See attached instruction sheets for additional information, including information regarding signs and symptoms to look out for, reasons to seek immediate care and other important instructions.   take percocet as needed for pain. do not drive when taking

## 2020-07-12 NOTE — ED PROVIDER NOTE - OBJECTIVE STATEMENT
pt is a 68yo female with pmhx of htn presents with arm pain s/p fall. pt reports she tripped and fell on to her left arm without head injury. pt was seen at [facility] who advised pt to come to ed for eval. pt did not take anything for pain. pt denies fever, numbness, loc.

## 2020-10-05 ENCOUNTER — APPOINTMENT (OUTPATIENT)
Dept: INTERNAL MEDICINE | Facility: CLINIC | Age: 70
End: 2020-10-05
Payer: MEDICARE

## 2020-10-05 VITALS
WEIGHT: 168 LBS | SYSTOLIC BLOOD PRESSURE: 128 MMHG | BODY MASS INDEX: 31.72 KG/M2 | HEART RATE: 75 BPM | HEIGHT: 61 IN | DIASTOLIC BLOOD PRESSURE: 74 MMHG | RESPIRATION RATE: 16 BRPM | TEMPERATURE: 98.7 F | OXYGEN SATURATION: 98 %

## 2020-10-05 PROBLEM — I10 ESSENTIAL (PRIMARY) HYPERTENSION: Chronic | Status: ACTIVE | Noted: 2020-07-12

## 2020-10-05 PROBLEM — E78.5 HYPERLIPIDEMIA, UNSPECIFIED: Chronic | Status: ACTIVE | Noted: 2020-07-12

## 2020-10-05 PROCEDURE — 90662 IIV NO PRSV INCREASED AG IM: CPT

## 2020-10-05 PROCEDURE — 36415 COLL VENOUS BLD VENIPUNCTURE: CPT

## 2020-10-05 PROCEDURE — G0008: CPT

## 2020-10-05 PROCEDURE — 99214 OFFICE O/P EST MOD 30 MIN: CPT | Mod: 25

## 2020-10-05 PROCEDURE — G0442 ANNUAL ALCOHOL SCREEN 15 MIN: CPT | Mod: 59

## 2020-10-05 RX ORDER — LOSARTAN POTASSIUM 25 MG/1
25 TABLET, FILM COATED ORAL
Qty: 90 | Refills: 0 | Status: ACTIVE | COMMUNITY
Start: 2020-07-30

## 2020-10-05 NOTE — HEALTH RISK ASSESSMENT
[0] : 2) Feeling down, depressed, or hopeless: Not at all (0) [Yes] : Yes [4 or more  times a week (4 pts)] : 4 or more  times a week (4 points) [1 or 2 (0 pts)] : 1 or 2 (0 points) [Never (0 pts)] : Never (0 points) [No] : In the past 12 months have you used drugs other than those required for medical reasons? No [PapSmearComments] : S/p hysterectomy.  [] : No [Audit-CScore] : 4 [[id number]] : 0

## 2020-10-05 NOTE — ADDENDUM
[FreeTextEntry1] : I, Dejon Michele, acted solely as scribe for Dr. Ta Fischer DO on this date 10/05/2020  9:20AM .\par \par All medical record entries made by the Scribe were at my, Dr. Ta Fiscehr DO direction and personally dictated by me on 10/05/2020  9:20AM. I have reviewed the chart and agree that the record accurately reflects my personal performance of the history, physical exam, assessment and plan. I have also personally directed, reviewed and agreed with the chart.\par

## 2020-10-05 NOTE — HISTORY OF PRESENT ILLNESS
[Spouse] : spouse [FreeTextEntry1] : fasting blood work and general follow-up\par  [de-identified] : Patient is a 69 year old female with a past medical history as below who presents for fasting blood work and general follow-up. Patient states she is taking all medications as prescribed and denies any adverse reactions or side effects. GERD is well-managed on Dexilant. Patient has been attending PT for a left humerus fracture secondary to a fall in July. She has been seeing orthopedist, Dr. Bernardino Lujan. She currently denies any LUE discomfort and notes full ROM. Patient notes joint pain/stiffness in the morning that resolves over the course of the day likely secondary to arthritis. Patient has been seeing liver specialist, Dr. Connolly given history of chronic hepatitis C. Her most recent abdominal imaging revealed mild fatty liver. Patient inquires about receiving the flu vaccine today. She is due to receive the Pneumovax 23. Blood work done on 6/24/20 revealed low WBC (3.75) and HGB (9.8). She inquires about checking CRP with blood work today.

## 2020-10-05 NOTE — PHYSICAL EXAM
[No Acute Distress] : no acute distress [Well Nourished] : well nourished [Well Developed] : well developed [Well-Appearing] : well-appearing [Normal Sclera/Conjunctiva] : normal sclera/conjunctiva [PERRL] : pupils equal round and reactive to light [EOMI] : extraocular movements intact [Normal Outer Ear/Nose] : the outer ears and nose were normal in appearance [Normal Oropharynx] : the oropharynx was normal [No JVD] : no jugular venous distention [No Lymphadenopathy] : no lymphadenopathy [Supple] : supple [Thyroid Normal, No Nodules] : the thyroid was normal and there were no nodules present [No Respiratory Distress] : no respiratory distress  [No Accessory Muscle Use] : no accessory muscle use [Clear to Auscultation] : lungs were clear to auscultation bilaterally [Normal Rate] : normal rate  [Regular Rhythm] : with a regular rhythm [Normal S1, S2] : normal S1 and S2 [No Murmur] : no murmur heard [No Carotid Bruits] : no carotid bruits [No Abdominal Bruit] : a ~M bruit was not heard ~T in the abdomen [No Varicosities] : no varicosities [Pedal Pulses Present] : the pedal pulses are present [No Edema] : there was no peripheral edema [No Palpable Aorta] : no palpable aorta [No Extremity Clubbing/Cyanosis] : no extremity clubbing/cyanosis [Soft] : abdomen soft [Non Tender] : non-tender [Non-distended] : non-distended [No Masses] : no abdominal mass palpated [No HSM] : no HSM [Normal Bowel Sounds] : normal bowel sounds [Normal Posterior Cervical Nodes] : no posterior cervical lymphadenopathy [Normal Anterior Cervical Nodes] : no anterior cervical lymphadenopathy [No CVA Tenderness] : no CVA  tenderness [No Spinal Tenderness] : no spinal tenderness [No Joint Swelling] : no joint swelling [Grossly Normal Strength/Tone] : grossly normal strength/tone [No Rash] : no rash [Coordination Grossly Intact] : coordination grossly intact [No Focal Deficits] : no focal deficits [Normal Gait] : normal gait [Normal Affect] : the affect was normal [Normal Insight/Judgement] : insight and judgment were intact [de-identified] : obese

## 2020-10-08 LAB
25(OH)D3 SERPL-MCNC: 55.7 NG/ML
ALBUMIN SERPL ELPH-MCNC: 4.9 G/DL
ALP BLD-CCNC: 93 U/L
ALT SERPL-CCNC: 15 U/L
ANION GAP SERPL CALC-SCNC: 12 MMOL/L
AST SERPL-CCNC: 18 U/L
BASOPHILS # BLD AUTO: 0.05 K/UL
BASOPHILS NFR BLD AUTO: 1.2 %
BILIRUB SERPL-MCNC: 0.2 MG/DL
BUN SERPL-MCNC: 18 MG/DL
CALCIUM SERPL-MCNC: 10 MG/DL
CHLORIDE SERPL-SCNC: 106 MMOL/L
CHOLEST SERPL-MCNC: 125 MG/DL
CHOLEST/HDLC SERPL: 2.2 RATIO
CO2 SERPL-SCNC: 25 MMOL/L
CREAT SERPL-MCNC: 0.7 MG/DL
CRP SERPL HS-MCNC: 1.16 MG/L
EOSINOPHIL # BLD AUTO: 0.1 K/UL
EOSINOPHIL NFR BLD AUTO: 2.4 %
ESTIMATED AVERAGE GLUCOSE: 103 MG/DL
FERRITIN SERPL-MCNC: 6 NG/ML
GLUCOSE SERPL-MCNC: 94 MG/DL
HBA1C MFR BLD HPLC: 5.2 %
HCT VFR BLD CALC: 35.7 %
HDLC SERPL-MCNC: 56 MG/DL
HGB BLD-MCNC: 10.6 G/DL
IMM GRANULOCYTES NFR BLD AUTO: 0.2 %
IRON SATN MFR SERPL: 9 %
IRON SERPL-MCNC: 34 UG/DL
LDLC SERPL CALC-MCNC: 52 MG/DL
LYMPHOCYTES # BLD AUTO: 1.57 K/UL
LYMPHOCYTES NFR BLD AUTO: 37.9 %
MAN DIFF?: NORMAL
MCHC RBC-ENTMCNC: 26.3 PG
MCHC RBC-ENTMCNC: 29.7 GM/DL
MCV RBC AUTO: 88.6 FL
MONOCYTES # BLD AUTO: 0.41 K/UL
MONOCYTES NFR BLD AUTO: 9.9 %
NEUTROPHILS # BLD AUTO: 2 K/UL
NEUTROPHILS NFR BLD AUTO: 48.4 %
PLATELET # BLD AUTO: 342 K/UL
POTASSIUM SERPL-SCNC: 4.6 MMOL/L
PROT SERPL-MCNC: 7.7 G/DL
RBC # BLD: 4.03 M/UL
RBC # FLD: 17.2 %
SODIUM SERPL-SCNC: 143 MMOL/L
TIBC SERPL-MCNC: 378 UG/DL
TRIGL SERPL-MCNC: 80 MG/DL
TSH SERPL-ACNC: 1.99 UIU/ML
UIBC SERPL-MCNC: 344 UG/DL
VIT B12 SERPL-MCNC: 1012 PG/ML
WBC # FLD AUTO: 4.14 K/UL

## 2020-10-20 ENCOUNTER — APPOINTMENT (OUTPATIENT)
Dept: INTERNAL MEDICINE | Facility: CLINIC | Age: 70
End: 2020-10-20
Payer: MEDICARE

## 2020-10-20 VITALS
DIASTOLIC BLOOD PRESSURE: 64 MMHG | WEIGHT: 167 LBS | BODY MASS INDEX: 31.53 KG/M2 | RESPIRATION RATE: 16 BRPM | HEART RATE: 66 BPM | TEMPERATURE: 98.4 F | SYSTOLIC BLOOD PRESSURE: 118 MMHG | HEIGHT: 61 IN | OXYGEN SATURATION: 98 %

## 2020-10-20 PROCEDURE — 99213 OFFICE O/P EST LOW 20 MIN: CPT | Mod: 25

## 2020-10-20 PROCEDURE — G0009: CPT

## 2020-10-20 PROCEDURE — 90732 PPSV23 VACC 2 YRS+ SUBQ/IM: CPT

## 2020-10-20 NOTE — PHYSICAL EXAM
[No Acute Distress] : no acute distress [Well Nourished] : well nourished [Well Developed] : well developed [Well-Appearing] : well-appearing [PERRL] : pupils equal round and reactive to light [Normal Sclera/Conjunctiva] : normal sclera/conjunctiva [EOMI] : extraocular movements intact [Normal Outer Ear/Nose] : the outer ears and nose were normal in appearance [Normal Oropharynx] : the oropharynx was normal [No Lymphadenopathy] : no lymphadenopathy [No JVD] : no jugular venous distention [Thyroid Normal, No Nodules] : the thyroid was normal and there were no nodules present [Supple] : supple [No Accessory Muscle Use] : no accessory muscle use [No Respiratory Distress] : no respiratory distress  [Clear to Auscultation] : lungs were clear to auscultation bilaterally [Normal Rate] : normal rate  [Regular Rhythm] : with a regular rhythm [Normal S1, S2] : normal S1 and S2 [No Carotid Bruits] : no carotid bruits [No Murmur] : no murmur heard [No Varicosities] : no varicosities [No Abdominal Bruit] : a ~M bruit was not heard ~T in the abdomen [Pedal Pulses Present] : the pedal pulses are present [No Palpable Aorta] : no palpable aorta [No Edema] : there was no peripheral edema [No Extremity Clubbing/Cyanosis] : no extremity clubbing/cyanosis [Soft] : abdomen soft [Non Tender] : non-tender [Non-distended] : non-distended [No HSM] : no HSM [No Masses] : no abdominal mass palpated [Normal Bowel Sounds] : normal bowel sounds [Normal Posterior Cervical Nodes] : no posterior cervical lymphadenopathy [Normal Anterior Cervical Nodes] : no anterior cervical lymphadenopathy [No CVA Tenderness] : no CVA  tenderness [No Spinal Tenderness] : no spinal tenderness [No Joint Swelling] : no joint swelling [Grossly Normal Strength/Tone] : grossly normal strength/tone [No Rash] : no rash [Coordination Grossly Intact] : coordination grossly intact [No Focal Deficits] : no focal deficits [Normal Gait] : normal gait [Normal Affect] : the affect was normal [Normal Insight/Judgement] : insight and judgment were intact

## 2020-10-20 NOTE — ADDENDUM
[FreeTextEntry1] : I, Dejon Michele, acted solely as scribe for Dr. Ta Fischer DO on this date 10/20/2020  3:00PM .\par \par All medical record entries made by the Scribe were at my, Dr. Ta Fischer DO direction and personally dictated by me on 10/20/2020  3:00PM. I have reviewed the chart and agree that the record accurately reflects my personal performance of the history, physical exam, assessment and plan. I have also personally directed, reviewed and agreed with the chart.\par

## 2020-10-20 NOTE — PLAN
[FreeTextEntry1] : Immunization\par Pneumovax 23 - 0.5mL x 1 administered intramuscularly to right deltoid \par Shingrix - discussed, patient to determine if vaccine is covered under medical benefits of current insurance plan and follow up for 1st dose; otherwise, instructed to follow up at the pharmacy for vaccine administration\par Cardiology \par hypertension - continue Losartan Potassium 25mg p.o.q.d. - continue low sodium diet and weight loss \par continue Propranolol HCl 20mg p.o. p.r.n. as directed by cardiologist, Dr. Wilhelm for palpitations/tachycardia\par hyperlipidemia - continue Atorvastatin Calcium 20mg p.o.q.d. - continue low cholesterol/low fat diet \par continue Aspirin 81mg p.o.q.d. and Ubiquinol 100mg p.o.q.d. \par Endocrinology\par obesity - continue low carbohydrate diet and CV exercise\par continue Vitamin D-3 p.o. with meals as directed \par Gastroenterology\par fatty liver/chronic hepatitis C - continue to follow up with liver specialist, Dr. Connolly; hepatitis C in remission\par GERD/Castillo's esophagus - continue Dexilant 60mg p.o.q.d. - continue antireflux measures\par Musculoskeletal\par osteopenia - continue Osteo Bi-Flex tablets p.o.q.d. and Calcium 600mg BID p.o.q.d. \par LUE fracture - secondary to fall - continue PT - continue to follow up with orthopedist, Dr. Lujan

## 2020-10-20 NOTE — HEALTH RISK ASSESSMENT
[Yes] : Yes [4 or more  times a week (4 pts)] : 4 or more  times a week (4 points) [1 or 2 (0 pts)] : 1 or 2 (0 points) [Never (0 pts)] : Never (0 points) [No] : In the past 12 months have you used drugs other than those required for medical reasons? No [0] : 1) Little interest or pleasure doing things: Not at all (0) [Audit-CScore] : 4 [] : No [PapSmearComments] : S/p hysterectomy.  [[id number]] : 0 [BoneDensityDate] : 01/19 [BoneDensityComments] : Osteopenia.

## 2020-10-20 NOTE — ASSESSMENT
[FreeTextEntry1] : Patient is a 69 year old female with a past medical history as above who presents for Pneumonia Vaccine (Pneumovax 23) administration.

## 2020-10-20 NOTE — HISTORY OF PRESENT ILLNESS
[FreeTextEntry1] : Pneumonia Vaccine (Pneumovax 23) [de-identified] : Patient is a 69 year old female with a past medical history as below who presents for Pneumonia Vaccine (Pneumovax 23) administration. Prevnar 13 was administered in August 2019. Patient states she is taking all medications as prescribed and denies any adverse reactions or side effects. GERD is well-managed on Dexilant. Patient states she feels well overall with no new complaints. She inquires about the Shingles Vaccine (Shingrix).

## 2021-01-06 ENCOUNTER — LABORATORY RESULT (OUTPATIENT)
Age: 71
End: 2021-01-06

## 2021-01-06 ENCOUNTER — APPOINTMENT (OUTPATIENT)
Dept: INTERNAL MEDICINE | Facility: CLINIC | Age: 71
End: 2021-01-06
Payer: MEDICARE

## 2021-01-06 VITALS
TEMPERATURE: 97.4 F | BODY MASS INDEX: 32.28 KG/M2 | HEIGHT: 61 IN | SYSTOLIC BLOOD PRESSURE: 130 MMHG | RESPIRATION RATE: 16 BRPM | DIASTOLIC BLOOD PRESSURE: 76 MMHG | OXYGEN SATURATION: 97 % | HEART RATE: 72 BPM | WEIGHT: 171 LBS

## 2021-01-06 PROCEDURE — 36415 COLL VENOUS BLD VENIPUNCTURE: CPT

## 2021-01-06 PROCEDURE — G0439: CPT

## 2021-01-06 NOTE — HISTORY OF PRESENT ILLNESS
[Spouse] : spouse [FreeTextEntry1] : annual wellness visit [de-identified] : Patient is a 70 year old female with a past medical history as below who presents for an annual wellness visit. Patient states she is taking all medications as prescribed and denies any adverse reactions or side effects. Patient notes an upcoming appointment with cardiologist, Dr. Wilhelm. Patient sees her GYN annually. She is due for annual breast imaging. Her last bone density testing was in January 2019. Patient will be seeing gastroenterologist, Dr. Erazo to schedule a repeat screening colonoscopy. Patient states her anxiety has been worsening secondary to the pandemic. She notes issues with sleep and elevated heart rate likely secondary to the anxiety. She states her symptoms usually resolve after taking a dose of Propranolol. Patient notes issues with hearing likely secondary to cerumen impaction (right ear). Patient received the flu vaccine on 10/5/20. She received the Prevnar 13 on 8/16/20. She received the Pneumovax 23 on 10/20/20. She has not received the Shingles (Shingrix) Vaccine.

## 2021-01-06 NOTE — PHYSICAL EXAM
[No Acute Distress] : no acute distress [Well Nourished] : well nourished [Well Developed] : well developed [Well-Appearing] : well-appearing [Normal Sclera/Conjunctiva] : normal sclera/conjunctiva [PERRL] : pupils equal round and reactive to light [EOMI] : extraocular movements intact [Normal Oropharynx] : the oropharynx was normal [No JVD] : no jugular venous distention [No Lymphadenopathy] : no lymphadenopathy [Supple] : supple [Thyroid Normal, No Nodules] : the thyroid was normal and there were no nodules present [No Respiratory Distress] : no respiratory distress  [No Accessory Muscle Use] : no accessory muscle use [Clear to Auscultation] : lungs were clear to auscultation bilaterally [Normal Rate] : normal rate  [Regular Rhythm] : with a regular rhythm [Normal S1, S2] : normal S1 and S2 [No Murmur] : no murmur heard [No Carotid Bruits] : no carotid bruits [No Abdominal Bruit] : a ~M bruit was not heard ~T in the abdomen [No Varicosities] : no varicosities [Pedal Pulses Present] : the pedal pulses are present [No Edema] : there was no peripheral edema [No Palpable Aorta] : no palpable aorta [No Extremity Clubbing/Cyanosis] : no extremity clubbing/cyanosis [Soft] : abdomen soft [Non Tender] : non-tender [Non-distended] : non-distended [No Masses] : no abdominal mass palpated [No HSM] : no HSM [Normal Bowel Sounds] : normal bowel sounds [Normal Posterior Cervical Nodes] : no posterior cervical lymphadenopathy [Normal Anterior Cervical Nodes] : no anterior cervical lymphadenopathy [No CVA Tenderness] : no CVA  tenderness [No Spinal Tenderness] : no spinal tenderness [No Joint Swelling] : no joint swelling [Grossly Normal Strength/Tone] : grossly normal strength/tone [No Rash] : no rash [Coordination Grossly Intact] : coordination grossly intact [No Focal Deficits] : no focal deficits [Normal Gait] : normal gait [Normal Affect] : the affect was normal [Normal Insight/Judgement] : insight and judgment were intact [Kyphosis] : kyphosis [Scoliosis] : scoliosis [de-identified] : cerumen present in right ear with impaction  [de-identified] : obese

## 2021-01-06 NOTE — ASSESSMENT
[FreeTextEntry1] : Patient is a 70 year old female with a past medical history as above who presents for an annual wellness visit.

## 2021-01-06 NOTE — HEALTH RISK ASSESSMENT
[Yes] : Yes [4 or more  times a week (4 pts)] : 4 or more  times a week (4 points) [1 or 2 (0 pts)] : 1 or 2 (0 points) [Never (0 pts)] : Never (0 points) [No] : In the past 12 months have you used drugs other than those required for medical reasons? No [0] : 2) Feeling down, depressed, or hopeless: Not at all (0) [] : No [Audit-CScore] : 4 [[id number]] : 0 [MammogramComments] : Results of upcoming breast imaging to be requested. [PapSmearComments] : S/p hysterectomy.  [BoneDensityDate] : 01/19 [BoneDensityComments] : Osteopenia.  [ColonoscopyComments] : Follow up with Dr. Erazo's office to schedule repeat screening.

## 2021-01-06 NOTE — ADDENDUM
[FreeTextEntry1] : I, Dejon Michele, acted solely as scribe for Dr. Ta Fischer DO on this date 01/06/2021  9:50AM .\par \par All medical record entries made by the Scribe were at my, Dr. Ta Fischer DO direction and personally dictated by me on 01/06/2021  9:50AM. I have reviewed the chart and agree that the record accurately reflects my personal performance of the history, physical exam, assessment and plan. I have also personally directed, reviewed and agreed with the chart.\par

## 2021-01-06 NOTE — PLAN
[FreeTextEntry1] : Constitutional\par history of fatigue - check CBC, CMP, and thyroid panel\par ENT\par right ear cerumen impaction - recommended OTC Debrox q.d. for 10 days as directed and following up for ear cleaning\par Cardiology \par hypertension - continue Losartan Potassium 25mg p.o.q.d. - advised low sodium diet and weight loss; EKG to be done at cardiologist, Dr. Wilhelm's office \par continue Propranolol HCl 20mg p.o. p.r.n. as directed by Dr. Wilhelm for palpitations/tachycardia\par hyperlipidemia - continue Atorvastatin Calcium 20mg p.o.q.d. - advised low cholesterol/low fat diet - check FLP and LFTs\par continue Aspirin 81mg p.o.q.d. and Ubiquinol 100mg p.o.q.d. \par Endocrinology\par obesity - advised low carbohydrate diet and CV exercise\par continue vitamin D-3 p.o. with meals as directed - check vitamin D \par Gynecology\par Follow up with GYN for annual visit; results of upcoming breast imaging to be requested\par Gastroenterology\par colonoscopy - follow up with gastroenterologist, Dr. Erazo's office to schedule repeat screening\par chronic hepatitis C - s/p treatment; hepatitis C in remission\par GERD/Castillo's esophagus - continue Dexilant 60mg p.o.q.d. - continue antireflux measures\par Musculoskeletal\par osteopenia - continue Osteo Bi-Flex tablets p.o.q.d. and Calcium 600mg BID p.o.q.d. \par Hematology\par anemia - check CBC\par Psychiatry\par anxiety - discussed initialing pharmacotherapy, declined; advised to take dose of Propranolol when symptoms occur (panic, elevated heart rate)\par \par check female panel, hemoglobin A1C, thyroid panel, C-Reactive Protein (Cardiac and Serum), and UA; EKG to be done at cardiologistDr. Wilhelm's office.

## 2021-01-07 ENCOUNTER — APPOINTMENT (OUTPATIENT)
Dept: GASTROENTEROLOGY | Facility: CLINIC | Age: 71
End: 2021-01-07
Payer: MEDICARE

## 2021-01-07 VITALS
OXYGEN SATURATION: 98 % | WEIGHT: 169 LBS | HEART RATE: 68 BPM | BODY MASS INDEX: 31.91 KG/M2 | SYSTOLIC BLOOD PRESSURE: 128 MMHG | HEIGHT: 61 IN | DIASTOLIC BLOOD PRESSURE: 80 MMHG | TEMPERATURE: 97.5 F

## 2021-01-07 PROCEDURE — 99203 OFFICE O/P NEW LOW 30 MIN: CPT

## 2021-01-07 PROCEDURE — 99214 OFFICE O/P EST MOD 30 MIN: CPT

## 2021-01-07 NOTE — HISTORY OF PRESENT ILLNESS
[FreeTextEntry1] : Patient came to the office today to arrange for upper endoscopy and colonoscopy. She has a history of Castillo's esophagus. Last examination was performed approximately 2 years ago. Patient also has a history of adenomatous polyps and is desirous of surveillance colonoscopy. The patient offers no present complaints of nausea vomiting fever chills rectal bleeding or melena. Patient has no cardiac or pulmonary complaint

## 2021-01-07 NOTE — ASSESSMENT
[FreeTextEntry1] : Impression and plan\par \par I will arrange for upper endoscopy and colonoscopy for followup of Castillo's esophagus and for history of polyps. I will also recommend abdominal sonogram to followup previously noted abnormality within the gallbladder suggesting adenomyomatosis the risks benefits alternatives and limitations of testing were discussed. Patient will call back results when complete.

## 2021-01-11 ENCOUNTER — NON-APPOINTMENT (OUTPATIENT)
Age: 71
End: 2021-01-11

## 2021-01-11 LAB
25(OH)D3 SERPL-MCNC: 62.5 NG/ML
ALBUMIN SERPL ELPH-MCNC: 4.9 G/DL
ALP BLD-CCNC: 75 U/L
ALT SERPL-CCNC: 10 U/L
ANION GAP SERPL CALC-SCNC: 14 MMOL/L
APPEARANCE: CLEAR
AST SERPL-CCNC: 15 U/L
BASOPHILS # BLD AUTO: 0.05 K/UL
BASOPHILS NFR BLD AUTO: 1.1 %
BILIRUB SERPL-MCNC: 0.4 MG/DL
BILIRUBIN URINE: NEGATIVE
BLOOD URINE: NEGATIVE
BUN SERPL-MCNC: 20 MG/DL
CALCIUM SERPL-MCNC: 9.7 MG/DL
CHLORIDE SERPL-SCNC: 104 MMOL/L
CHOLEST SERPL-MCNC: 130 MG/DL
CO2 SERPL-SCNC: 24 MMOL/L
COLOR: YELLOW
CREAT SERPL-MCNC: 0.7 MG/DL
CRP SERPL HS-MCNC: 0.73 MG/L
CRP SERPL-MCNC: <0.1 MG/DL
EOSINOPHIL # BLD AUTO: 0.06 K/UL
EOSINOPHIL NFR BLD AUTO: 1.3 %
ESTIMATED AVERAGE GLUCOSE: 103 MG/DL
GLUCOSE QUALITATIVE U: NEGATIVE
GLUCOSE SERPL-MCNC: 92 MG/DL
HBA1C MFR BLD HPLC: 5.2 %
HCT VFR BLD CALC: 40.8 %
HDLC SERPL-MCNC: 50 MG/DL
HGB BLD-MCNC: 12.6 G/DL
IMM GRANULOCYTES NFR BLD AUTO: 0.2 %
KETONES URINE: NEGATIVE
LDLC SERPL CALC-MCNC: 55 MG/DL
LEUKOCYTE ESTERASE URINE: ABNORMAL
LYMPHOCYTES # BLD AUTO: 1.36 K/UL
LYMPHOCYTES NFR BLD AUTO: 30.6 %
MAN DIFF?: NORMAL
MCHC RBC-ENTMCNC: 29.2 PG
MCHC RBC-ENTMCNC: 30.9 GM/DL
MCV RBC AUTO: 94.7 FL
MONOCYTES # BLD AUTO: 0.46 K/UL
MONOCYTES NFR BLD AUTO: 10.3 %
NEUTROPHILS # BLD AUTO: 2.51 K/UL
NEUTROPHILS NFR BLD AUTO: 56.5 %
NITRITE URINE: NEGATIVE
NONHDLC SERPL-MCNC: 80 MG/DL
PH URINE: 5.5
PLATELET # BLD AUTO: 267 K/UL
POTASSIUM SERPL-SCNC: 4.3 MMOL/L
PROT SERPL-MCNC: 7.4 G/DL
PROTEIN URINE: NEGATIVE
RBC # BLD: 4.31 M/UL
RBC # FLD: 16.1 %
SODIUM SERPL-SCNC: 142 MMOL/L
SPECIFIC GRAVITY URINE: 1.02
T3 SERPL-MCNC: 125 NG/DL
T3FREE SERPL-MCNC: 3.1 PG/ML
T3RU NFR SERPL: 1.1 TBI
T4 FREE SERPL-MCNC: 1 NG/DL
T4 SERPL-MCNC: 6.2 UG/DL
THYROGLOB AB SERPL-ACNC: <20 IU/ML
THYROPEROXIDASE AB SERPL IA-ACNC: <10 IU/ML
TRIGL SERPL-MCNC: 123 MG/DL
TSH SERPL-ACNC: 1.58 UIU/ML
UROBILINOGEN URINE: NORMAL
WBC # FLD AUTO: 4.45 K/UL

## 2021-01-16 ENCOUNTER — APPOINTMENT (OUTPATIENT)
Dept: ULTRASOUND IMAGING | Facility: CLINIC | Age: 71
End: 2021-01-16
Payer: MEDICARE

## 2021-01-16 ENCOUNTER — OUTPATIENT (OUTPATIENT)
Dept: OUTPATIENT SERVICES | Facility: HOSPITAL | Age: 71
LOS: 1 days | End: 2021-01-16
Payer: MEDICARE

## 2021-01-16 DIAGNOSIS — D13.5 BENIGN NEOPLASM OF EXTRAHEPATIC BILE DUCTS: ICD-10-CM

## 2021-01-16 PROCEDURE — 76700 US EXAM ABDOM COMPLETE: CPT

## 2021-01-16 PROCEDURE — 76700 US EXAM ABDOM COMPLETE: CPT | Mod: 26

## 2021-04-07 ENCOUNTER — APPOINTMENT (OUTPATIENT)
Dept: INTERNAL MEDICINE | Facility: CLINIC | Age: 71
End: 2021-04-07
Payer: MEDICARE

## 2021-04-07 VITALS
SYSTOLIC BLOOD PRESSURE: 120 MMHG | DIASTOLIC BLOOD PRESSURE: 72 MMHG | WEIGHT: 169 LBS | RESPIRATION RATE: 16 BRPM | TEMPERATURE: 97.4 F | HEIGHT: 61 IN | HEART RATE: 65 BPM | OXYGEN SATURATION: 97 % | BODY MASS INDEX: 31.91 KG/M2

## 2021-04-07 PROCEDURE — 36415 COLL VENOUS BLD VENIPUNCTURE: CPT

## 2021-04-07 PROCEDURE — 99214 OFFICE O/P EST MOD 30 MIN: CPT | Mod: 25

## 2021-04-07 NOTE — ADDENDUM
[FreeTextEntry1] : I, Dejon Michele, acted solely as scribe for Dr. Ta Fischer DO on this date 04/07/2021  8:40AM .\par \par All medical record entries made by the Scribe were at my, Dr. Ta Fischer DO direction and personally dictated by me on 04/07/2021  8:40AM. I have reviewed the chart and agree that the record accurately reflects my personal performance of the history, physical exam, assessment and plan. I have also personally directed, reviewed and agreed with the chart.\par

## 2021-04-07 NOTE — PLAN
[FreeTextEntry1] : Cardiology \par benign essential hypertension - continue Losartan Potassium 25mg p.o.q.d. - continue low sodium diet and weight loss; will continue to monitor BP\par continue Propranolol HCl 20mg p.o. p.r.n. as directed by Dr. Wilhelm for palpitations/tachycardia\par hyperlipidemia - continue Atorvastatin Calcium 20mg p.o.q.d. - continue low cholesterol/low fat diet - check FLP and LFTs\par continue Aspirin 81mg p.o.q.d. \par Endocrinology\par obesity - continue low carbohydrate diet and CV exercise - check hemoglobin A1C\par difficulty losing weight - check TSH/TFTs\par continue Vitamin D-3 5000 IU p.o.q.d. with meals as directed - check Vitamin D \par Gastroenterology\par chronic hepatitis C - s/p treatment; hepatitis C in remission - follow up with hepatologist, Dr. Connolly as necessary\par screening colonoscopy - scheduled with gastroenterologist, Dr. Erazo for later this month  \par GERD/Castillo's esophagus - continue Dexilant 60mg p.o.q.d. - continue antireflux measures; endoscopy scheduled with gastroenterologist, Dr. Erazo for later this month\par Musculoskeletal\par osteopenia - continue Osteo Bi-Flex 2500mg BID p.o.q.d. as directed; continue Calcium 600mg BID p.o.q.d. and Vitamin D 5000 IU p.o.q.d. with meals as directed\par Hematology\par anemia - continue Iron p.o. as directed - check CBC, iron/TIBC, and serum ferritin \par Psychiatry\par anxiety - advised to take dose of Propranolol HCl 20mg when symptoms occur (panic, elevated heart rate)\par \par check female panel, hemoglobin A1C, vitamin panel, iron/TIBC, serum ferritin, and CRP (Cardiac and Serum)

## 2021-04-07 NOTE — HEALTH RISK ASSESSMENT
[Yes] : Yes [4 or more  times a week (4 pts)] : 4 or more  times a week (4 points) [1 or 2 (0 pts)] : 1 or 2 (0 points) [Never (0 pts)] : Never (0 points) [No] : In the past 12 months have you used drugs other than those required for medical reasons? No [0] : 2) Feeling down, depressed, or hopeless: Not at all (0) [] : No [Audit-CScore] : 4 [de-identified] : Walking.  [de-identified] : Regular.  [[id number]] : 0 [MammogramComments] : Results of breast imaging to be requested. [PapSmearComments] : S/p hysterectomy.  [BoneDensityDate] : 01/19 [BoneDensityComments] : Osteopenia.  [ColonoscopyComments] : Scheduled with Dr. Erazo.

## 2021-04-07 NOTE — HISTORY OF PRESENT ILLNESS
[Spouse] : spouse [FreeTextEntry1] : fasting blood work and general follow-up\par  [de-identified] : Patient is a 70 year old female with a past medical history as below who presents for fasting blood work and general follow-up. Patient states she is taking all medications as prescribed and denies any adverse reactions or side effects. She denies lightheadedness or dizziness on Losartan Potassium. She denies diffuse arthralgias or myalgias on Atorvastatin Calcium. GERD is well-managed on Dexilant. She denies any breakthrough acid reflux. Patient states endoscopy and colonoscopy have been scheduled with gastroenterologist, Dr. Erazo for later this month. Patient states she has been trying to maintain a well-balanced diet and exercise regularly (walking). She notes difficulty losing weight. Patient has received both doses of the COVID-19 Vaccine.

## 2021-04-07 NOTE — PHYSICAL EXAM
[No Acute Distress] : no acute distress [Well Nourished] : well nourished [Well Developed] : well developed [Well-Appearing] : well-appearing [Normal Sclera/Conjunctiva] : normal sclera/conjunctiva [PERRL] : pupils equal round and reactive to light [EOMI] : extraocular movements intact [Normal Outer Ear/Nose] : the outer ears and nose were normal in appearance [Normal Oropharynx] : the oropharynx was normal [No JVD] : no jugular venous distention [No Lymphadenopathy] : no lymphadenopathy [Supple] : supple [Thyroid Normal, No Nodules] : the thyroid was normal and there were no nodules present [No Respiratory Distress] : no respiratory distress  [No Accessory Muscle Use] : no accessory muscle use [Clear to Auscultation] : lungs were clear to auscultation bilaterally [Normal Rate] : normal rate  [Regular Rhythm] : with a regular rhythm [Normal S1, S2] : normal S1 and S2 [No Murmur] : no murmur heard [No Carotid Bruits] : no carotid bruits [No Abdominal Bruit] : a ~M bruit was not heard ~T in the abdomen [No Varicosities] : no varicosities [Pedal Pulses Present] : the pedal pulses are present [No Edema] : there was no peripheral edema [No Palpable Aorta] : no palpable aorta [No Extremity Clubbing/Cyanosis] : no extremity clubbing/cyanosis [Soft] : abdomen soft [Non Tender] : non-tender [Non-distended] : non-distended [No Masses] : no abdominal mass palpated [No HSM] : no HSM [Normal Bowel Sounds] : normal bowel sounds [Normal Posterior Cervical Nodes] : no posterior cervical lymphadenopathy [Normal Anterior Cervical Nodes] : no anterior cervical lymphadenopathy [No CVA Tenderness] : no CVA  tenderness [No Spinal Tenderness] : no spinal tenderness [No Joint Swelling] : no joint swelling [Grossly Normal Strength/Tone] : grossly normal strength/tone [No Rash] : no rash [Coordination Grossly Intact] : coordination grossly intact [No Focal Deficits] : no focal deficits [Normal Gait] : normal gait [Deep Tendon Reflexes (DTR)] : deep tendon reflexes were 2+ and symmetric [Normal Affect] : the affect was normal [Normal Insight/Judgement] : insight and judgment were intact [de-identified] : obese

## 2021-04-11 ENCOUNTER — NON-APPOINTMENT (OUTPATIENT)
Age: 71
End: 2021-04-11

## 2021-04-11 LAB
24R-OH-CALCIDIOL SERPL-MCNC: 69.3 PG/ML
25(OH)D3 SERPL-MCNC: 58.5 NG/ML
ALBUMIN SERPL ELPH-MCNC: 4.8 G/DL
ALP BLD-CCNC: 72 U/L
ALT SERPL-CCNC: 9 U/L
ANION GAP SERPL CALC-SCNC: 11 MMOL/L
AST SERPL-CCNC: 15 U/L
BASOPHILS # BLD AUTO: 0.02 K/UL
BASOPHILS NFR BLD AUTO: 0.4 %
BILIRUB SERPL-MCNC: 0.4 MG/DL
BUN SERPL-MCNC: 17 MG/DL
CALCIUM SERPL-MCNC: 9.5 MG/DL
CHLORIDE SERPL-SCNC: 106 MMOL/L
CHOLEST SERPL-MCNC: 122 MG/DL
CO2 SERPL-SCNC: 26 MMOL/L
CREAT SERPL-MCNC: 0.61 MG/DL
CRP SERPL HS-MCNC: 1.07 MG/L
CRP SERPL-MCNC: <3 MG/L
EOSINOPHIL # BLD AUTO: 0.07 K/UL
EOSINOPHIL NFR BLD AUTO: 1.6 %
ESTIMATED AVERAGE GLUCOSE: 97 MG/DL
FERRITIN SERPL-MCNC: 35 NG/ML
GLUCOSE SERPL-MCNC: 90 MG/DL
HBA1C MFR BLD HPLC: 5 %
HCT VFR BLD CALC: 41.3 %
HCT VFR BLD CALC: 41.3 %
HDLC SERPL-MCNC: 49 MG/DL
HGB BLD-MCNC: 12.8 G/DL
HGB BLD-MCNC: 12.8 G/DL
IMM GRANULOCYTES NFR BLD AUTO: 0.2 %
IRON SATN MFR SERPL: 24 %
IRON SERPL-MCNC: 75 UG/DL
LDLC SERPL CALC-MCNC: 48 MG/DL
LYMPHOCYTES # BLD AUTO: 1.24 K/UL
LYMPHOCYTES NFR BLD AUTO: 27.7 %
MAGNESIUM SERPL-MCNC: 2.1 MG/DL
MAN DIFF?: NORMAL
MCHC RBC-ENTMCNC: 31 GM/DL
MCHC RBC-ENTMCNC: 31.7 PG
MCV RBC AUTO: 102.2 FL
MONOCYTES # BLD AUTO: 0.41 K/UL
MONOCYTES NFR BLD AUTO: 9.2 %
NEUTROPHILS # BLD AUTO: 2.72 K/UL
NEUTROPHILS NFR BLD AUTO: 60.9 %
NONHDLC SERPL-MCNC: 73 MG/DL
PLATELET # BLD AUTO: 261 K/UL
POTASSIUM SERPL-SCNC: 4.2 MMOL/L
PROT SERPL-MCNC: 7.3 G/DL
RBC # BLD: 4.04 M/UL
RBC # FLD: 13.2 %
SODIUM SERPL-SCNC: 142 MMOL/L
TIBC SERPL-MCNC: 306 UG/DL
TRIGL SERPL-MCNC: 124 MG/DL
TSH SERPL-ACNC: 1.49 UIU/ML
UIBC SERPL-MCNC: 231 UG/DL
VIT B12 SERPL-MCNC: 699 PG/ML
WBC # FLD AUTO: 4.47 K/UL

## 2021-04-12 LAB — VIT C SERPL-MCNC: 0.5 MG/DL

## 2021-04-13 LAB — UBIQUINONE10 SERPL-MCNC: 0.94 UG/ML

## 2021-04-15 LAB — VIT B1 SERPL-MCNC: 133.2 NMOL/L

## 2021-04-17 LAB — VIT A SERPL-MCNC: 43.4 UG/DL

## 2021-04-26 LAB — SARS-COV-2 N GENE NPH QL NAA+PROBE: NOT DETECTED

## 2021-04-28 ENCOUNTER — APPOINTMENT (OUTPATIENT)
Dept: GASTROENTEROLOGY | Facility: AMBULATORY MEDICAL SERVICES | Age: 71
End: 2021-04-28
Payer: MEDICARE

## 2021-04-28 PROCEDURE — 45380 COLONOSCOPY AND BIOPSY: CPT

## 2021-04-28 PROCEDURE — 43239 EGD BIOPSY SINGLE/MULTIPLE: CPT

## 2021-05-20 ENCOUNTER — APPOINTMENT (OUTPATIENT)
Dept: GASTROENTEROLOGY | Facility: CLINIC | Age: 71
End: 2021-05-20
Payer: MEDICARE

## 2021-05-20 VITALS
OXYGEN SATURATION: 98 % | HEIGHT: 61 IN | DIASTOLIC BLOOD PRESSURE: 70 MMHG | WEIGHT: 166 LBS | TEMPERATURE: 97.1 F | SYSTOLIC BLOOD PRESSURE: 110 MMHG | HEART RATE: 71 BPM | BODY MASS INDEX: 31.34 KG/M2

## 2021-05-20 PROCEDURE — 99213 OFFICE O/P EST LOW 20 MIN: CPT

## 2021-05-20 NOTE — ASSESSMENT
[FreeTextEntry1] : Impression and\par \par Patient came to the office today for followup and discussion she is doing well she will continue on PPI indefinitely. Repeat upper endoscopy in about 2 years is suggested for surveillance of Castillo's esophagus. Call back in the interim if any new issues or complaints.

## 2021-05-20 NOTE — HISTORY OF PRESENT ILLNESS
[FreeTextEntry1] : Patient returns for follow up and discussion of recent upper endoscopy and colonoscopy benign findings were noted the patient does have Castillo's esophagus similar in appearance to previous small rectal polyp was removed. Patient offers no complaints except for chronic intermittent dyspepsia.

## 2021-06-21 ENCOUNTER — APPOINTMENT (OUTPATIENT)
Dept: INTERNAL MEDICINE | Facility: CLINIC | Age: 71
End: 2021-06-21
Payer: MEDICARE

## 2021-06-21 ENCOUNTER — LABORATORY RESULT (OUTPATIENT)
Age: 71
End: 2021-06-21

## 2021-06-21 VITALS
RESPIRATION RATE: 16 BRPM | OXYGEN SATURATION: 97 % | DIASTOLIC BLOOD PRESSURE: 74 MMHG | SYSTOLIC BLOOD PRESSURE: 120 MMHG | BODY MASS INDEX: 31.72 KG/M2 | HEIGHT: 61 IN | WEIGHT: 168 LBS | HEART RATE: 71 BPM | TEMPERATURE: 97.2 F

## 2021-06-21 PROCEDURE — 36415 COLL VENOUS BLD VENIPUNCTURE: CPT

## 2021-06-21 PROCEDURE — 99214 OFFICE O/P EST MOD 30 MIN: CPT | Mod: 25

## 2021-06-21 NOTE — HISTORY OF PRESENT ILLNESS
[Spouse] : spouse [FreeTextEntry1] : fasting blood work and general follow-up\par  [de-identified] : Patient is a 70 year old female with a past medical history as below who presents for fasting blood work and general follow-up. Patient states she is taking all medications as prescribed and denies any adverse reactions or side effects. She denies lightheadedness or dizziness on Losartan Potassium. She denies diffuse arthralgias or myalgias on Atorvastatin Calcium. GERD is well-managed on Dexilant. She denies any breakthrough acid reflux on the medication. Patient inquires about additional testing with blood work today (thyroid panel, vitamin panel).

## 2021-06-21 NOTE — ADDENDUM
[FreeTextEntry1] : I, Dejon Michele, acted solely as scribe for Dr. Ta Fischer DO on this date 06/21/2021  1:20PM .\par \par All medical record entries made by the Scribe were at my, Dr. Ta Fischer DO direction and personally dictated by me on 06/21/2021  1:20PM. I have reviewed the chart and agree that the record accurately reflects my personal performance of the history, physical exam, assessment and plan. I have also personally directed, reviewed and agreed with the chart.\par

## 2021-06-21 NOTE — PHYSICAL EXAM
[No Acute Distress] : no acute distress [Well Nourished] : well nourished [Well Developed] : well developed [Well-Appearing] : well-appearing [Normal Sclera/Conjunctiva] : normal sclera/conjunctiva [PERRL] : pupils equal round and reactive to light [EOMI] : extraocular movements intact [Normal Outer Ear/Nose] : the outer ears and nose were normal in appearance [Normal Oropharynx] : the oropharynx was normal [No JVD] : no jugular venous distention [No Lymphadenopathy] : no lymphadenopathy [Supple] : supple [Thyroid Normal, No Nodules] : the thyroid was normal and there were no nodules present [No Respiratory Distress] : no respiratory distress  [No Accessory Muscle Use] : no accessory muscle use [Clear to Auscultation] : lungs were clear to auscultation bilaterally [Normal Rate] : normal rate  [Regular Rhythm] : with a regular rhythm [Normal S1, S2] : normal S1 and S2 [No Murmur] : no murmur heard [No Carotid Bruits] : no carotid bruits [No Abdominal Bruit] : a ~M bruit was not heard ~T in the abdomen [No Varicosities] : no varicosities [Pedal Pulses Present] : the pedal pulses are present [No Edema] : there was no peripheral edema [No Palpable Aorta] : no palpable aorta [No Extremity Clubbing/Cyanosis] : no extremity clubbing/cyanosis [Soft] : abdomen soft [Non Tender] : non-tender [Non-distended] : non-distended [No Masses] : no abdominal mass palpated [No HSM] : no HSM [Normal Bowel Sounds] : normal bowel sounds [Normal Posterior Cervical Nodes] : no posterior cervical lymphadenopathy [Normal Anterior Cervical Nodes] : no anterior cervical lymphadenopathy [No CVA Tenderness] : no CVA  tenderness [No Spinal Tenderness] : no spinal tenderness [No Joint Swelling] : no joint swelling [Grossly Normal Strength/Tone] : grossly normal strength/tone [No Rash] : no rash [Coordination Grossly Intact] : coordination grossly intact [No Focal Deficits] : no focal deficits [Normal Gait] : normal gait [Deep Tendon Reflexes (DTR)] : deep tendon reflexes were 2+ and symmetric [Normal Affect] : the affect was normal [Normal Insight/Judgement] : insight and judgment were intact [de-identified] : obese

## 2021-06-21 NOTE — HEALTH RISK ASSESSMENT
[Yes] : Yes [4 or more  times a week (4 pts)] : 4 or more  times a week (4 points) [1 or 2 (0 pts)] : 1 or 2 (0 points) [Never (0 pts)] : Never (0 points) [No] : In the past 12 months have you used drugs other than those required for medical reasons? No [0] : 2) Feeling down, depressed, or hopeless: Not at all (0) [] : No [Audit-CScore] : 4 [de-identified] : Walking.  [de-identified] : Regular.  [[id number]] : 0 [MammogramComments] : No mammographic or sonographic evidence of malignancy.  [MammogramDate] : 02/19 [PapSmearComments] : S/p hysterectomy.  [BoneDensityDate] : 01/19 [BoneDensityComments] : Osteopenia.  [ColonoscopyDate] : 04/21 [ColonoscopyComments] : 3 mm polyp in distal rectum.

## 2021-06-21 NOTE — PLAN
[FreeTextEntry1] : Constitutional\par history of fatigue - check CBC, iron/TIBC, serum ferritin, CMP, thyroid panel, and vitamin panel\par Cardiology \par benign essential hypertension - continue Losartan Potassium 25mg p.o.q.d. as directed - continue low sodium diet; advised weight loss; will continue to monitor BP\par continue Propranolol HCl 20mg p.o. p.r.n. as directed by Dr. Wilhelm for palpitations/tachycardia\par hyperlipidemia - continue Atorvastatin Calcium 20mg p.o.q.d. - advised low cholesterol/low fat diet - check FLP and LFTs\par continue Aspirin 81mg p.o.q.d. \par Endocrinology\par obesity - advised low carbohydrate diet and increased CV exercise - check hemoglobin A1C\par continue Vitamin D-3 5000 IU p.o.q.d. with meals as directed - check Vitamin D \par Gastroenterology\par GERD/Castillo's esophagus - continue Dexilant 60mg p.o.q.d. - continue antireflux measures - continue to follow up with gastroenterologist, Dr. Erazo\par chronic hepatitis C - s/p treatment; hepatitis C in remission - follow up with hepatologist, Dr. Connolly as necessary \par Musculoskeletal\par osteopenia - continue Osteo Bi-Flex 2500mg BID p.o.q.d. as directed; continue Calcium 600mg BID p.o.q.d. and Vitamin D 5000 IU p.o.q.d. with meals as directed\par Hematology\par history of anemia - check CBC, iron/TIBC, and serum ferritin \par Psychiatry\par anxiety - previously advised to take dose of Propranolol HCl 20mg when symptoms occur (panic, elevated heart rate)\par \par check female panel, hemoglobin A1C, thyroid panel, vitamin panel, iron/TIBC, and serum ferritin

## 2021-06-24 LAB
24R-OH-CALCIDIOL SERPL-MCNC: 64.5 PG/ML
25(OH)D3 SERPL-MCNC: 57.1 NG/ML
ALBUMIN SERPL ELPH-MCNC: 4.7 G/DL
ALP BLD-CCNC: 70 U/L
ALT SERPL-CCNC: 10 U/L
ANION GAP SERPL CALC-SCNC: 12 MMOL/L
AST SERPL-CCNC: 14 U/L
BASOPHILS # BLD AUTO: 0.05 K/UL
BASOPHILS NFR BLD AUTO: 1.1 %
BILIRUB SERPL-MCNC: 0.4 MG/DL
BUN SERPL-MCNC: 20 MG/DL
CALCIUM SERPL-MCNC: 9.8 MG/DL
CHLORIDE SERPL-SCNC: 104 MMOL/L
CHOLEST SERPL-MCNC: 135 MG/DL
CO2 SERPL-SCNC: 24 MMOL/L
CREAT SERPL-MCNC: 0.68 MG/DL
EOSINOPHIL # BLD AUTO: 0.08 K/UL
EOSINOPHIL NFR BLD AUTO: 1.8 %
ESTIMATED AVERAGE GLUCOSE: 100 MG/DL
FERRITIN SERPL-MCNC: 55 NG/ML
GLUCOSE SERPL-MCNC: 90 MG/DL
HBA1C MFR BLD HPLC: 5.1 %
HCT VFR BLD CALC: 39.6 %
HCT VFR BLD CALC: 39.6 %
HDLC SERPL-MCNC: 53 MG/DL
HGB BLD-MCNC: 12.5 G/DL
HGB BLD-MCNC: 12.5 G/DL
IMM GRANULOCYTES NFR BLD AUTO: 0.2 %
IRON SATN MFR SERPL: 25 %
IRON SERPL-MCNC: 74 UG/DL
LDLC SERPL CALC-MCNC: 60 MG/DL
LYMPHOCYTES # BLD AUTO: 1.6 K/UL
LYMPHOCYTES NFR BLD AUTO: 35.2 %
MAGNESIUM SERPL-MCNC: 2.1 MG/DL
MAN DIFF?: NORMAL
MCHC RBC-ENTMCNC: 31.6 GM/DL
MCHC RBC-ENTMCNC: 31.7 PG
MCV RBC AUTO: 100.5 FL
MONOCYTES # BLD AUTO: 0.4 K/UL
MONOCYTES NFR BLD AUTO: 8.8 %
NEUTROPHILS # BLD AUTO: 2.4 K/UL
NEUTROPHILS NFR BLD AUTO: 52.9 %
NONHDLC SERPL-MCNC: 81 MG/DL
PLATELET # BLD AUTO: 259 K/UL
POTASSIUM SERPL-SCNC: 4.1 MMOL/L
PROT SERPL-MCNC: 7.3 G/DL
RBC # BLD: 3.94 M/UL
RBC # FLD: 13.2 %
SODIUM SERPL-SCNC: 140 MMOL/L
T3 SERPL-MCNC: 117 NG/DL
T3FREE SERPL-MCNC: 2.71 PG/ML
T3RU NFR SERPL: 1.1 TBI
T4 FREE SERPL-MCNC: 1 NG/DL
T4 SERPL-MCNC: 6.3 UG/DL
THYROGLOB AB SERPL-ACNC: <20 IU/ML
THYROPEROXIDASE AB SERPL IA-ACNC: <10 IU/ML
TIBC SERPL-MCNC: 300 UG/DL
TRIGL SERPL-MCNC: 105 MG/DL
TSH SERPL-ACNC: 1.18 UIU/ML
UIBC SERPL-MCNC: 226 UG/DL
VIT B12 SERPL-MCNC: 977 PG/ML
WBC # FLD AUTO: 4.54 K/UL

## 2021-06-25 LAB
VIT B1 SERPL-MCNC: 116.2 NMOL/L
VIT C SERPL-MCNC: 0.8 MG/DL

## 2021-06-27 LAB — VIT A SERPL-MCNC: 44.3 UG/DL

## 2021-06-29 LAB — UBIQUINONE10 SERPL-MCNC: 0.89 UG/ML

## 2021-11-24 ENCOUNTER — TRANSCRIPTION ENCOUNTER (OUTPATIENT)
Age: 71
End: 2021-11-24

## 2021-11-30 ENCOUNTER — LABORATORY RESULT (OUTPATIENT)
Age: 71
End: 2021-11-30

## 2021-11-30 ENCOUNTER — APPOINTMENT (OUTPATIENT)
Dept: INTERNAL MEDICINE | Facility: CLINIC | Age: 71
End: 2021-11-30
Payer: MEDICARE

## 2021-11-30 VITALS
OXYGEN SATURATION: 98 % | DIASTOLIC BLOOD PRESSURE: 80 MMHG | BODY MASS INDEX: 31.53 KG/M2 | TEMPERATURE: 36.6 F | WEIGHT: 167 LBS | SYSTOLIC BLOOD PRESSURE: 118 MMHG | HEIGHT: 61 IN | HEART RATE: 7 BPM | RESPIRATION RATE: 18 BRPM

## 2021-11-30 DIAGNOSIS — Z87.898 PERSONAL HISTORY OF OTHER SPECIFIED CONDITIONS: ICD-10-CM

## 2021-11-30 PROCEDURE — 36415 COLL VENOUS BLD VENIPUNCTURE: CPT

## 2021-11-30 PROCEDURE — 99214 OFFICE O/P EST MOD 30 MIN: CPT | Mod: 25

## 2021-11-30 NOTE — ADDENDUM
[FreeTextEntry1] : I, Dejon Michele, acted solely as scribe for Dr. Ta Fischer DO on this date 11/30/2021 10:10AM .\par \par All medical record entries made by the Scribe were at my, Dr. Ta Fischer DO direction and personally dictated by me on 11/30/2021 10:10AM. I have reviewed the chart and agree that the record accurately reflects my personal performance of the history, physical exam, assessment and plan. I have also personally directed, reviewed and agreed with the chart.\par

## 2021-11-30 NOTE — PHYSICAL EXAM
[No Acute Distress] : no acute distress [Well Nourished] : well nourished [Well Developed] : well developed [Well-Appearing] : well-appearing [Normal Sclera/Conjunctiva] : normal sclera/conjunctiva [PERRL] : pupils equal round and reactive to light [EOMI] : extraocular movements intact [Normal Outer Ear/Nose] : the outer ears and nose were normal in appearance [Normal Oropharynx] : the oropharynx was normal [No JVD] : no jugular venous distention [No Lymphadenopathy] : no lymphadenopathy [Supple] : supple [Thyroid Normal, No Nodules] : the thyroid was normal and there were no nodules present [No Respiratory Distress] : no respiratory distress  [No Accessory Muscle Use] : no accessory muscle use [Clear to Auscultation] : lungs were clear to auscultation bilaterally [Normal Rate] : normal rate  [Regular Rhythm] : with a regular rhythm [Normal S1, S2] : normal S1 and S2 [No Murmur] : no murmur heard [No Carotid Bruits] : no carotid bruits [No Abdominal Bruit] : a ~M bruit was not heard ~T in the abdomen [No Varicosities] : no varicosities [Pedal Pulses Present] : the pedal pulses are present [No Edema] : there was no peripheral edema [No Palpable Aorta] : no palpable aorta [No Extremity Clubbing/Cyanosis] : no extremity clubbing/cyanosis [Soft] : abdomen soft [Non Tender] : non-tender [Non-distended] : non-distended [No Masses] : no abdominal mass palpated [No HSM] : no HSM [Normal Bowel Sounds] : normal bowel sounds [Normal Posterior Cervical Nodes] : no posterior cervical lymphadenopathy [Normal Anterior Cervical Nodes] : no anterior cervical lymphadenopathy [No CVA Tenderness] : no CVA  tenderness [No Spinal Tenderness] : no spinal tenderness [No Joint Swelling] : no joint swelling [Grossly Normal Strength/Tone] : grossly normal strength/tone [No Rash] : no rash [Coordination Grossly Intact] : coordination grossly intact [No Focal Deficits] : no focal deficits [Normal Gait] : normal gait [Deep Tendon Reflexes (DTR)] : deep tendon reflexes were 2+ and symmetric [Normal Affect] : the affect was normal [Normal Insight/Judgement] : insight and judgment were intact [de-identified] : obese

## 2021-11-30 NOTE — DATA REVIEWED
Chief Complaint   Patient presents with    Physical    Shortness of Breath     1. Have you been to the ER, urgent care clinic since your last visit? Hospitalized since your last visit? No    2. Have you seen or consulted any other health care providers outside of the [address] [place] [address] since your last visit? Include any pap smears or colon screening.  No [No studies available for review at this time.] : No studies available for review at this time.

## 2021-11-30 NOTE — PLAN
[FreeTextEntry1] : Constitutional\par history of fatigue - check CBC, CMP, Iron/TIBC, Serum Ferritin, thyroid panel, and vitamin panel\par ENT\par tinnitus - recommended starting OTC Lipo-Flavonoid; if the ringing worsens, recommended seeing ENT specialist for further evaluation\par Cardiology \par benign essential hypertension - continue Losartan Potassium 25mg p.o.q.d. as directed - continue low sodium diet and weight loss; will continue to monitor BP\par Continue Propranolol HCl 20mg p.o. p.r.n. as directed by Dr. Wilhelm for palpitations/tachycardia\par hyperlipidemia - continue Atorvastatin Calcium 20mg p.o.q.d. as directed - continue low cholesterol/low fat diet - check FLP and LFTs\par Endocrinology\par obesity - continue low carbohydrate diet; previously recommended increasing CV exercise - check hemoglobin A1C and thyroid panel\par Continue Vitamin D-3 5000 IU p.o.q.d. with meals as directed - check Vitamin D \par Gastroenterology\par GERD/Castillo's esophagus - continue Dexilant 60mg p.o.q.d. as directed - continue antireflux measures - continue to follow up with gastroenterologist, Dr. Erazo\par chronic hepatitis C - s/p treatment; hepatitis C in remission - follow up with hepatologist, Dr. Connolly as necessary \par Musculoskeletal\par osteopenia - continue Calcium 600mg BID p.o.q.d. and Vitamin D 5000 IU p.o.q.d. with meals as directed\par Hematology\par history of anemia - check CBC, Iron/TIBC, and Serum ferritin \par Psychiatry\par anxiety - previously advised to take dose of Propranolol HCl 20mg when symptoms occur (panic, elevated heart rate)\par Immunization\par flu vaccine - discussed, vaccine to be administered during next visit\par \par check female panel, hemoglobin A1C, thyroid panel, vitamin panel, Iron/TIBC, and Serum Ferritin

## 2021-11-30 NOTE — HEALTH RISK ASSESSMENT
[Yes] : Yes [4 or more  times a week (4 pts)] : 4 or more  times a week (4 points) [1 or 2 (0 pts)] : 1 or 2 (0 points) [Never (0 pts)] : Never (0 points) [No] : In the past 12 months have you used drugs other than those required for medical reasons? No [0] : 2) Feeling down, depressed, or hopeless: Not at all (0) [] : No [Audit-CScore] : 4 [de-identified] : Walking.  [de-identified] : Regular.  [[id number]] : 0 [MammogramDate] : 02/19 [MammogramComments] : No mammographic or sonographic evidence of malignancy.  [PapSmearComments] : S/p hysterectomy.  [BoneDensityDate] : 01/19 [BoneDensityComments] : Osteopenia.  [ColonoscopyDate] : 04/21 [ColonoscopyComments] : 3 mm polyp in distal rectum.

## 2021-12-07 LAB
24R-OH-CALCIDIOL SERPL-MCNC: 54.5 PG/ML
25(OH)D3 SERPL-MCNC: 66.4 NG/ML
ALBUMIN SERPL ELPH-MCNC: 4.5 G/DL
ALP BLD-CCNC: 66 U/L
ALT SERPL-CCNC: 12 U/L
ANION GAP SERPL CALC-SCNC: 14 MMOL/L
AST SERPL-CCNC: 18 U/L
BASOPHILS # BLD AUTO: 0.04 K/UL
BASOPHILS NFR BLD AUTO: 0.8 %
BILIRUB SERPL-MCNC: 0.4 MG/DL
BUN SERPL-MCNC: 17 MG/DL
CALCIUM SERPL-MCNC: 10 MG/DL
CHLORIDE SERPL-SCNC: 103 MMOL/L
CHOLEST SERPL-MCNC: 142 MG/DL
CO2 SERPL-SCNC: 23 MMOL/L
CREAT SERPL-MCNC: 0.69 MG/DL
EOSINOPHIL # BLD AUTO: 0.09 K/UL
EOSINOPHIL NFR BLD AUTO: 1.8 %
ESTIMATED AVERAGE GLUCOSE: 97 MG/DL
FERRITIN SERPL-MCNC: 56 NG/ML
GLUCOSE SERPL-MCNC: 95 MG/DL
HBA1C MFR BLD HPLC: 5 %
HCT VFR BLD CALC: 41.5 %
HCT VFR BLD CALC: 41.5 %
HDLC SERPL-MCNC: 52 MG/DL
HGB BLD-MCNC: 13.2 G/DL
HGB BLD-MCNC: 13.2 G/DL
IMM GRANULOCYTES NFR BLD AUTO: 0.2 %
IRON SATN MFR SERPL: 28 %
IRON SERPL-MCNC: 77 UG/DL
LDLC SERPL CALC-MCNC: 68 MG/DL
LYMPHOCYTES # BLD AUTO: 1.23 K/UL
LYMPHOCYTES NFR BLD AUTO: 24.6 %
MAGNESIUM SERPL-MCNC: 2.1 MG/DL
MAN DIFF?: NORMAL
MCHC RBC-ENTMCNC: 31.8 GM/DL
MCHC RBC-ENTMCNC: 31.9 PG
MCV RBC AUTO: 100.2 FL
MONOCYTES # BLD AUTO: 0.46 K/UL
MONOCYTES NFR BLD AUTO: 9.2 %
NEUTROPHILS # BLD AUTO: 3.17 K/UL
NEUTROPHILS NFR BLD AUTO: 63.4 %
NONHDLC SERPL-MCNC: 91 MG/DL
PLATELET # BLD AUTO: 274 K/UL
POTASSIUM SERPL-SCNC: 4.4 MMOL/L
PROT SERPL-MCNC: 7.4 G/DL
RBC # BLD: 4.14 M/UL
RBC # FLD: 13.3 %
SODIUM SERPL-SCNC: 140 MMOL/L
T3 SERPL-MCNC: 124 NG/DL
T3FREE SERPL-MCNC: 3.32 PG/ML
T3RU NFR SERPL: 1.2 TBI
T4 FREE SERPL-MCNC: 1.1 NG/DL
T4 SERPL-MCNC: 6.4 UG/DL
THYROGLOB AB SERPL-ACNC: <20 IU/ML
THYROPEROXIDASE AB SERPL IA-ACNC: <10 IU/ML
TIBC SERPL-MCNC: 278 UG/DL
TRIGL SERPL-MCNC: 111 MG/DL
TSH SERPL-ACNC: 1.73 UIU/ML
UBIQUINONE10 SERPL-MCNC: 0.79 UG/ML
UIBC SERPL-MCNC: 201 UG/DL
VIT B1 SERPL-MCNC: 130.6 NMOL/L
VIT B12 SERPL-MCNC: 1187 PG/ML
VIT C SERPL-MCNC: 0.9 MG/DL
WBC # FLD AUTO: 5 K/UL

## 2021-12-13 LAB — VIT A SERPL-MCNC: 45.4 UG/DL

## 2022-01-12 ENCOUNTER — MED ADMIN CHARGE (OUTPATIENT)
Age: 72
End: 2022-01-12

## 2022-01-13 ENCOUNTER — APPOINTMENT (OUTPATIENT)
Dept: INTERNAL MEDICINE | Facility: CLINIC | Age: 72
End: 2022-01-13
Payer: MEDICARE

## 2022-01-13 VITALS
HEART RATE: 86 BPM | RESPIRATION RATE: 16 BRPM | TEMPERATURE: 97.2 F | WEIGHT: 172 LBS | BODY MASS INDEX: 32.47 KG/M2 | DIASTOLIC BLOOD PRESSURE: 80 MMHG | OXYGEN SATURATION: 98 % | HEIGHT: 61 IN | SYSTOLIC BLOOD PRESSURE: 128 MMHG

## 2022-01-13 PROCEDURE — G0008: CPT

## 2022-01-13 PROCEDURE — 90662 IIV NO PRSV INCREASED AG IM: CPT

## 2022-01-13 PROCEDURE — 99213 OFFICE O/P EST LOW 20 MIN: CPT | Mod: 25

## 2022-01-13 NOTE — ADDENDUM
[FreeTextEntry1] : I, Dejon Michele, acted solely as scribe for Dr. Ta Fischer DO on this date 01/13/2022 12:40PM .\par \par All medical record entries made by the Scribe were at my, Dr. Ta Fischer DO direction and personally dictated by me on 01/13/2022 12:40PM. I have reviewed the chart and agree that the record accurately reflects my personal performance of the history, physical exam, assessment and plan. I have also personally directed, reviewed and agreed with the chart.\par

## 2022-01-13 NOTE — PLAN
[FreeTextEntry1] : Immunization\par flu vaccine - Fluzone Quadrivalent High-Dose (65+) 0.7mL x 1 administered intramuscularly to right deltoid \par Cardiology \par benign essential hypertension - continue Losartan Potassium 25mg p.o.q.d. as directed - continue low sodium diet and weight loss; will continue to monitor BP\par Continue Propranolol HCl 20mg p.o. p.r.n. as directed by Dr. Wilhelm for palpitations/tachycardia\par hyperlipidemia - continue Atorvastatin Calcium 20mg p.o.q.d. as directed - continue low cholesterol/low fat diet \par Endocrinology\par obesity - continue low carbohydrate diet; previously recommended increasing CV exercise \par Continue Vitamin D-3 5000 IU p.o.q.d. with meals as directed \par Gastroenterology\par GERD/Castillo's esophagus - continue Dexilant 60mg p.o.q.d. as directed - continue antireflux measures - continue to follow up with gastroenterologist, Dr. Erazo\par chronic hepatitis C - s/p treatment; hepatitis C in remission - follow up with hepatologist, Dr. Connolly as necessary \par Musculoskeletal\par osteopenia - continue Calcium 600mg BID p.o.q.d. as directed and Vitamin D 5000 IU p.o.q.d. with meals as directed \par Psychiatry\par anxiety - previously advised to take dose of Propranolol HCl 20mg when symptoms occur (panic, elevated heart rate)

## 2022-01-13 NOTE — ASSESSMENT
[FreeTextEntry1] : Patient is a 71 year old female with a past medical history as above who presents for the flu vaccine.

## 2022-01-13 NOTE — HISTORY OF PRESENT ILLNESS
[Spouse] : spouse [FreeTextEntry1] : flu vaccine [de-identified] : Patient is a 71 year old female with a past medical history as below who presents for the flu vaccine. Patient has received 3 doses of the COVID-19 Vaccine. Patient states she is taking all medications as prescribed and denies any adverse reactions or side effects. She denies lightheadedness or dizziness on Losartan Potassium. She denies diffuse arthralgias or myalgias on Atorvastatin Calcium. GERD has been well-managed on Dexilant. She denies any breakthrough acid reflux on the medication. Patient notes increased stress secondary to several personal issues.

## 2022-01-13 NOTE — PHYSICAL EXAM
[No Acute Distress] : no acute distress [Well Nourished] : well nourished [Well Developed] : well developed [Well-Appearing] : well-appearing [Normal Sclera/Conjunctiva] : normal sclera/conjunctiva [PERRL] : pupils equal round and reactive to light [EOMI] : extraocular movements intact [Normal Outer Ear/Nose] : the outer ears and nose were normal in appearance [Normal Oropharynx] : the oropharynx was normal [No JVD] : no jugular venous distention [No Lymphadenopathy] : no lymphadenopathy [Supple] : supple [Thyroid Normal, No Nodules] : the thyroid was normal and there were no nodules present [No Respiratory Distress] : no respiratory distress  [No Accessory Muscle Use] : no accessory muscle use [Clear to Auscultation] : lungs were clear to auscultation bilaterally [Normal Rate] : normal rate  [Regular Rhythm] : with a regular rhythm [Normal S1, S2] : normal S1 and S2 [No Murmur] : no murmur heard [No Carotid Bruits] : no carotid bruits [No Abdominal Bruit] : a ~M bruit was not heard ~T in the abdomen [No Varicosities] : no varicosities [Pedal Pulses Present] : the pedal pulses are present [No Edema] : there was no peripheral edema [No Palpable Aorta] : no palpable aorta [No Extremity Clubbing/Cyanosis] : no extremity clubbing/cyanosis [Soft] : abdomen soft [Non Tender] : non-tender [Non-distended] : non-distended [No Masses] : no abdominal mass palpated [No HSM] : no HSM [Normal Bowel Sounds] : normal bowel sounds [Normal Posterior Cervical Nodes] : no posterior cervical lymphadenopathy [Normal Anterior Cervical Nodes] : no anterior cervical lymphadenopathy [No CVA Tenderness] : no CVA  tenderness [No Spinal Tenderness] : no spinal tenderness [No Joint Swelling] : no joint swelling [Grossly Normal Strength/Tone] : grossly normal strength/tone [No Rash] : no rash [Coordination Grossly Intact] : coordination grossly intact [No Focal Deficits] : no focal deficits [Normal Gait] : normal gait [Deep Tendon Reflexes (DTR)] : deep tendon reflexes were 2+ and symmetric [Normal Affect] : the affect was normal [Normal Insight/Judgement] : insight and judgment were intact [de-identified] : obese

## 2022-01-13 NOTE — HEALTH RISK ASSESSMENT
[Yes] : Yes [4 or more  times a week (4 pts)] : 4 or more  times a week (4 points) [1 or 2 (0 pts)] : 1 or 2 (0 points) [Never (0 pts)] : Never (0 points) [No] : In the past 12 months have you used drugs other than those required for medical reasons? No [0] : 2) Feeling down, depressed, or hopeless: Not at all (0) [Never] : Never [PHQ-2 Negative - No further assessment needed] : PHQ-2 Negative - No further assessment needed [Audit-CScore] : 4 [de-identified] : Walking.  [de-identified] : Regular.  [[id number]] : 0 [MammogramDate] : 02/19 [MammogramComments] : No mammographic or sonographic evidence of malignancy.  [PapSmearComments] : S/p hysterectomy.  [BoneDensityDate] : 01/19 [BoneDensityComments] : Osteopenia.  [ColonoscopyDate] : 04/21 [ColonoscopyComments] : 3 mm polyp in distal rectum.

## 2022-02-11 DIAGNOSIS — H93.19 TINNITUS, UNSPECIFIED EAR: ICD-10-CM

## 2022-05-31 ENCOUNTER — APPOINTMENT (OUTPATIENT)
Dept: INTERNAL MEDICINE | Facility: CLINIC | Age: 72
End: 2022-05-31
Payer: MEDICARE

## 2022-05-31 VITALS
RESPIRATION RATE: 16 BRPM | TEMPERATURE: 98.7 F | SYSTOLIC BLOOD PRESSURE: 118 MMHG | WEIGHT: 170 LBS | HEART RATE: 78 BPM | OXYGEN SATURATION: 96 % | HEIGHT: 61 IN | DIASTOLIC BLOOD PRESSURE: 74 MMHG | BODY MASS INDEX: 32.1 KG/M2

## 2022-05-31 DIAGNOSIS — B18.2 CHRONIC VIRAL HEPATITIS C: ICD-10-CM

## 2022-05-31 PROCEDURE — 36415 COLL VENOUS BLD VENIPUNCTURE: CPT

## 2022-05-31 PROCEDURE — 99214 OFFICE O/P EST MOD 30 MIN: CPT | Mod: CS,25

## 2022-05-31 NOTE — HISTORY OF PRESENT ILLNESS
[Spouse] : spouse [FreeTextEntry1] : fasting blood work and general follow-up\par  [de-identified] : Patient is a 71 year old female with a past medical history as below who presents for fasting blood work and general follow-up. Patient states she is taking all medications as prescribed and denies any adverse reactions or side effects. She denies lightheadedness or dizziness on Losartan Potassium. She denies diffuse arthralgias or myalgias on Atorvastatin Calcium. GERD has been well-managed on Dexilant. She denies any breakthrough acid reflux on the medication. Patient has noted increased stress secondary to the recent passing of her father. Patient is vaccinated against COVID-19. She inquires about testing for COVID-19 antibodies with blood work today.

## 2022-05-31 NOTE — PLAN
[FreeTextEntry1] : Constitutional\par history of fatigue - check CBC, Iron/TIBC, Serum Ferritin, CMP, TSH, and vitamin panel\par Cardiology \par benign essential hypertension - continue Losartan Potassium 25mg p.o.q.d. as directed - check CMP - continue low sodium diet and weight loss; will continue to monitor BP\par Continue Propranolol HCl 20mg p.o. p.r.n. as directed by Dr. Wilhelm for palpitations/tachycardia\par hyperlipidemia - continue Atorvastatin Calcium 20mg p.o.q.d. as directed - continue low cholesterol/low fat diet - check FLP and LFTs\par Endocrinology\par obesity - continue low carbohydrate diet; previously recommended increasing CV exercise - check hemoglobin A1C and TSH\par Continue Vitamin D-3 5000 IU p.o.q.d. with a meal as directed - check Vitamin D \par Gastroenterology\par GERD/Castillo's esophagus - continue Dexilant 60mg p.o.q.d. as directed - continue antireflux measures - continue to follow up with gastroenterologist, Dr. Erazo\par chronic hepatitis C - s/p treatment; hepatitis C in remission - follow up with hepatologist, Dr. Connolly as necessary \par Musculoskeletal\par osteopenia - continue Calcium 600mg BID p.o.q.d. and Vitamin D 5000 IU p.o.q.d. with meals as directed\par Hematology\par history of anemia - check CBC, Iron/TIBC, and Serum Ferritin \par Psychiatry\par anxiety - previously advised to take dose of Propranolol HCl 20mg when symptoms occur (panic, elevated heart rate)\par Immunization/Infectious Disease\par S/p COVID-19 Vaccine - check COVID-19 Raghavendra Domain Antibody\par Check COVID-19 Nucleocapsid Antibody\par \par check female panel, hemoglobin A1C, vitamin panel, Iron/TIBC, Serum Ferritin, Cardiac CRP, COVID-19 Raghavendra Domain Antibody, and COVID-19 Nucleocapsid Antibody\par \par Patient to follow up for annual wellness visit.

## 2022-05-31 NOTE — ADDENDUM
[FreeTextEntry1] : I, Dejon Michele, acted solely as scribe for Dr. Ta Fischer DO on this date 05/31/2022  9:00AM .\par \par All medical record entries made by the Scribe were at my, Dr. Ta Fischer DO direction and personally dictated by me on 05/31/2022  9:00AM. I have reviewed the chart and agree that the record accurately reflects my personal performance of the history, physical exam, assessment and plan. I have also personally directed, reviewed and agreed with the chart.

## 2022-05-31 NOTE — HEALTH RISK ASSESSMENT
[Never] : Never [Yes] : Yes [4 or more  times a week (4 pts)] : 4 or more  times a week (4 points) [1 or 2 (0 pts)] : 1 or 2 (0 points) [Never (0 pts)] : Never (0 points) [No] : In the past 12 months have you used drugs other than those required for medical reasons? No [0] : 2) Feeling down, depressed, or hopeless: Not at all (0) [Audit-CScore] : 4 [de-identified] : Walking.  [de-identified] : Regular.  [JWS0Onmcg] : 0 [MammogramDate] : 02/19 [MammogramComments] : No mammographic or sonographic evidence of malignancy.  [PapSmearComments] : S/p hysterectomy.  [BoneDensityDate] : 01/19 [BoneDensityComments] : Osteopenia.  [ColonoscopyDate] : 04/21 [ColonoscopyComments] : 3 mm polyp in distal rectum.

## 2022-05-31 NOTE — PHYSICAL EXAM
[No Acute Distress] : no acute distress [Well Nourished] : well nourished [Well Developed] : well developed [Well-Appearing] : well-appearing [Normal Sclera/Conjunctiva] : normal sclera/conjunctiva [PERRL] : pupils equal round and reactive to light [EOMI] : extraocular movements intact [Normal Outer Ear/Nose] : the outer ears and nose were normal in appearance [Normal Oropharynx] : the oropharynx was normal [No JVD] : no jugular venous distention [No Lymphadenopathy] : no lymphadenopathy [Supple] : supple [Thyroid Normal, No Nodules] : the thyroid was normal and there were no nodules present [No Respiratory Distress] : no respiratory distress  [No Accessory Muscle Use] : no accessory muscle use [Clear to Auscultation] : lungs were clear to auscultation bilaterally [Normal Rate] : normal rate  [Regular Rhythm] : with a regular rhythm [Normal S1, S2] : normal S1 and S2 [No Murmur] : no murmur heard [No Carotid Bruits] : no carotid bruits [No Abdominal Bruit] : a ~M bruit was not heard ~T in the abdomen [No Varicosities] : no varicosities [Pedal Pulses Present] : the pedal pulses are present [No Edema] : there was no peripheral edema [No Palpable Aorta] : no palpable aorta [No Extremity Clubbing/Cyanosis] : no extremity clubbing/cyanosis [Soft] : abdomen soft [Non Tender] : non-tender [Non-distended] : non-distended [No Masses] : no abdominal mass palpated [No HSM] : no HSM [Normal Bowel Sounds] : normal bowel sounds [Normal Posterior Cervical Nodes] : no posterior cervical lymphadenopathy [Normal Anterior Cervical Nodes] : no anterior cervical lymphadenopathy [No CVA Tenderness] : no CVA  tenderness [No Spinal Tenderness] : no spinal tenderness [No Joint Swelling] : no joint swelling [Grossly Normal Strength/Tone] : grossly normal strength/tone [No Rash] : no rash [Coordination Grossly Intact] : coordination grossly intact [No Focal Deficits] : no focal deficits [Normal Gait] : normal gait [Deep Tendon Reflexes (DTR)] : deep tendon reflexes were 2+ and symmetric [Normal Affect] : the affect was normal [Normal Insight/Judgement] : insight and judgment were intact [Normal Voice/Communication] : normal voice/communication [Normal TMs] : both tympanic membranes were normal [Normal Supraclavicular Nodes] : no supraclavicular lymphadenopathy [Scoliosis] : no scoliosis [Acne] : no acne [Speech Grossly Normal] : speech grossly normal [Memory Grossly Normal] : memory grossly normal [Alert and Oriented x3] : oriented to person, place, and time [Normal Mood] : the mood was normal [de-identified] : obese

## 2022-06-07 ENCOUNTER — NON-APPOINTMENT (OUTPATIENT)
Age: 72
End: 2022-06-07

## 2022-06-07 LAB
24R-OH-CALCIDIOL SERPL-MCNC: 61.7 PG/ML
25(OH)D3 SERPL-MCNC: 65.9 NG/ML
ALBUMIN SERPL ELPH-MCNC: 4.6 G/DL
ALP BLD-CCNC: 67 U/L
ALT SERPL-CCNC: 8 U/L
ANION GAP SERPL CALC-SCNC: 11 MMOL/L
AST SERPL-CCNC: 11 U/L
BASOPHILS # BLD AUTO: 0.03 K/UL
BASOPHILS NFR BLD AUTO: 0.8 %
BILIRUB SERPL-MCNC: 0.4 MG/DL
BUN SERPL-MCNC: 24 MG/DL
CALCIUM SERPL-MCNC: 9.4 MG/DL
CHLORIDE SERPL-SCNC: 106 MMOL/L
CHOLEST SERPL-MCNC: 128 MG/DL
CO2 SERPL-SCNC: 24 MMOL/L
COVID-19 NUCLEOCAPSID  GAM ANTIBODY INTERPRETATION: NEGATIVE
COVID-19 SPIKE DOMAIN ANTIBODY INTERPRETATION: POSITIVE
CREAT SERPL-MCNC: 0.61 MG/DL
CRP SERPL HS-MCNC: 0.64 MG/L
EGFR: 94 ML/MIN/1.73M2
EOSINOPHIL # BLD AUTO: 0.07 K/UL
EOSINOPHIL NFR BLD AUTO: 1.8 %
ESTIMATED AVERAGE GLUCOSE: 97 MG/DL
FERRITIN SERPL-MCNC: 45 NG/ML
GLUCOSE SERPL-MCNC: 104 MG/DL
HBA1C MFR BLD HPLC: 5 %
HCT VFR BLD CALC: 39.5 %
HCT VFR BLD CALC: 39.5 %
HDLC SERPL-MCNC: 55 MG/DL
HGB BLD-MCNC: 12.1 G/DL
HGB BLD-MCNC: 12.1 G/DL
IMM GRANULOCYTES NFR BLD AUTO: 0.3 %
IRON SATN MFR SERPL: 28 %
IRON SERPL-MCNC: 79 UG/DL
LDLC SERPL CALC-MCNC: 57 MG/DL
LYMPHOCYTES # BLD AUTO: 1.25 K/UL
LYMPHOCYTES NFR BLD AUTO: 32 %
MAGNESIUM SERPL-MCNC: 2.2 MG/DL
MAN DIFF?: NORMAL
MCHC RBC-ENTMCNC: 30.6 GM/DL
MCHC RBC-ENTMCNC: 30.7 PG
MCV RBC AUTO: 100.3 FL
MONOCYTES # BLD AUTO: 0.37 K/UL
MONOCYTES NFR BLD AUTO: 9.5 %
NEUTROPHILS # BLD AUTO: 2.18 K/UL
NEUTROPHILS NFR BLD AUTO: 55.6 %
NONHDLC SERPL-MCNC: 73 MG/DL
PLATELET # BLD AUTO: 254 K/UL
POTASSIUM SERPL-SCNC: 4.4 MMOL/L
PROT SERPL-MCNC: 7.2 G/DL
RBC # BLD: 3.94 M/UL
RBC # FLD: 13.4 %
SARS-COV-2 AB SERPL IA-ACNC: >250 U/ML
SARS-COV-2 AB SERPL QL IA: 0.08 INDEX
SODIUM SERPL-SCNC: 141 MMOL/L
TIBC SERPL-MCNC: 279 UG/DL
TRIGL SERPL-MCNC: 78 MG/DL
TSH SERPL-ACNC: 1.03 UIU/ML
UIBC SERPL-MCNC: 200 UG/DL
VIT B12 SERPL-MCNC: 995 PG/ML
WBC # FLD AUTO: 3.91 K/UL

## 2022-06-08 LAB
VIT A SERPL-MCNC: 53.2 UG/DL
VIT B1 SERPL-MCNC: 129.5 NMOL/L

## 2022-06-09 LAB
UBIQUINONE10 SERPL-MCNC: 0.95 UG/ML
VIT B2 SERPL-MCNC: 230 UG/L
VIT C SERPL-MCNC: 0.6 MG/DL

## 2022-06-11 LAB — VIT B6 SERPL-MCNC: 9.5 UG/L

## 2022-11-07 NOTE — ED PROVIDER NOTE - CLINICAL SUMMARY MEDICAL DECISION MAKING FREE TEXT BOX
oriented to person, place and time , short and long term memory intact  pt with probable humerus fx per ucc. will do xrays, pain control, consult ortho and re-eval

## 2022-11-09 ENCOUNTER — RESULT CHARGE (OUTPATIENT)
Age: 72
End: 2022-11-09

## 2022-11-11 ENCOUNTER — NON-APPOINTMENT (OUTPATIENT)
Age: 72
End: 2022-11-11

## 2022-11-11 ENCOUNTER — APPOINTMENT (OUTPATIENT)
Dept: INTERNAL MEDICINE | Facility: CLINIC | Age: 72
End: 2022-11-11

## 2022-11-11 ENCOUNTER — LABORATORY RESULT (OUTPATIENT)
Age: 72
End: 2022-11-11

## 2022-11-11 VITALS
HEART RATE: 85 BPM | OXYGEN SATURATION: 97 % | DIASTOLIC BLOOD PRESSURE: 80 MMHG | TEMPERATURE: 98 F | RESPIRATION RATE: 17 BRPM | HEIGHT: 61 IN | SYSTOLIC BLOOD PRESSURE: 122 MMHG | WEIGHT: 167 LBS | BODY MASS INDEX: 31.53 KG/M2

## 2022-11-11 DIAGNOSIS — Z23 ENCOUNTER FOR IMMUNIZATION: ICD-10-CM

## 2022-11-11 PROCEDURE — 36415 COLL VENOUS BLD VENIPUNCTURE: CPT

## 2022-11-11 PROCEDURE — G0439: CPT

## 2022-11-11 PROCEDURE — 93000 ELECTROCARDIOGRAM COMPLETE: CPT | Mod: 59

## 2022-11-11 PROCEDURE — G0444 DEPRESSION SCREEN ANNUAL: CPT | Mod: 59

## 2022-11-11 PROCEDURE — 99213 OFFICE O/P EST LOW 20 MIN: CPT | Mod: 25

## 2022-11-11 PROCEDURE — G0008: CPT

## 2022-11-11 PROCEDURE — 90662 IIV NO PRSV INCREASED AG IM: CPT

## 2022-11-11 RX ORDER — METOPROLOL SUCCINATE 25 MG/1
25 TABLET, EXTENDED RELEASE ORAL
Qty: 90 | Refills: 0 | Status: ACTIVE | COMMUNITY
Start: 2022-09-06

## 2022-11-11 NOTE — PLAN
[FreeTextEntry1] : Constitutional\par history of fatigue - check CBC, Iron/TIBC, Serum Ferritin, CMP, TSH, and vitamin panel\par Cardiology \par benign essential hypertension - continue Losartan Potassium 25mg p.o.q.d and metoprolol ER 25 mg qd. as directed - check CMP - continue low sodium diet and weight loss; will continue to monitor BP\par Continue Propranolol HCl 20mg p.o. p.r.n. as directed by Dr. Wilhelm for palpitations/tachycardia\par hyperlipidemia - continue Atorvastatin Calcium 20mg p.o.q.d. as directed - continue low cholesterol/low fat diet - check FLP and LFTs\par Endocrinology\par obesity - continue low carbohydrate diet; previously recommended increasing CV exercise - check hemoglobin A1C and TSH\par Continue Vitamin D-3 5000 IU p.o.q.d. with a meal as directed - check Vitamin D \par Gastroenterology\par GERD/Castillo's esophagus - continue Dexilant 60mg p.o.q.d. as directed - continue antireflux measures - continue to follow up with gastroenterologist, Dr. Erazo\par chronic hepatitis C - s/p treatment; hepatitis C in remission - follow up with hepatologist, Dr. Connolly as necessary \par Musculoskeletal\par osteopenia - continue Calcium 600mg BID p.o.q.d. and Vitamin D 5000 IU p.o.q.d. with meals as directed\par Hematology\par history of anemia - check CBC, Iron/TIBC, and Serum Ferritin \par Psychiatry\par anxiety - previously advised to take dose of Propranolol HCl 20mg when symptoms occur (panic, elevated heart rate)\par Immunization/Infectious Disease-influenza vaccine high dose given today\par obesity- check A1C\par Encounter for screening for nutritional disiease -order- \par check iron total, magnesium, Potassium, vitamins A, B1, B2, B12, B6, C, D, K \par FamHx of Cardiac disorder-\par Order C- reactive protein \par \par \par \par \par check female panel, hemoglobin A1C, vitamin panel, Iron/TIBC, Serum Ferritin, Cardiac CRP, COVID-19 Raghavendra Domain Antibody, and COVID-19 Nucleocapsid Antibody, Flu shot administered. Check  EKG.\par \par

## 2022-11-11 NOTE — HISTORY OF PRESENT ILLNESS
[Spouse] : spouse [de-identified] : Patient is a 71 year old female with a past medical history as below who presents for fasting blood work and AWV. Patient states she is taking all medications as prescribed and denies any adverse reactions or side effects. She denies lightheadedness or dizziness on Losartan Potassium. She denies diffuse arthralgias or myalgias on Atorvastatin Calcium. GERD has been well-managed on Dexilant.\par She c/o pain in her ankles and she states that she has been walking more since returning to work. She takes OTC anti-inflammatory medications for it and it provides little relief. She denies any breakthrough acid reflux on the medication. Patient has noted increased stress secondary to the recent passing of her father. Patient is vaccinated against COVID-19. She inquires about testing for COVID-19 antibodies with blood work today.   [FreeTextEntry1] : fasting blood work and general follow-up\par

## 2022-11-11 NOTE — REVIEW OF SYSTEMS
[Negative] : Heme/Lymph [Recent Change In Weight] : ~T recent weight change [Joint Pain] : joint pain [Joint Stiffness] : joint stiffness [FreeTextEntry2] : -3 lb [FreeTextEntry9] : B/L Pain in ankles  [de-identified] : increased stress

## 2022-11-11 NOTE — PHYSICAL EXAM
[No Acute Distress] : no acute distress [Well Nourished] : well nourished [Well Developed] : well developed [Well-Appearing] : well-appearing [Normal Voice/Communication] : normal voice/communication [Normal Sclera/Conjunctiva] : normal sclera/conjunctiva [PERRL] : pupils equal round and reactive to light [EOMI] : extraocular movements intact [Normal Outer Ear/Nose] : the outer ears and nose were normal in appearance [Normal Oropharynx] : the oropharynx was normal [Normal TMs] : both tympanic membranes were normal [No JVD] : no jugular venous distention [No Lymphadenopathy] : no lymphadenopathy [Supple] : supple [Thyroid Normal, No Nodules] : the thyroid was normal and there were no nodules present [No Respiratory Distress] : no respiratory distress  [No Accessory Muscle Use] : no accessory muscle use [Clear to Auscultation] : lungs were clear to auscultation bilaterally [Normal Rate] : normal rate  [Regular Rhythm] : with a regular rhythm [Normal S1, S2] : normal S1 and S2 [No Murmur] : no murmur heard [No Carotid Bruits] : no carotid bruits [No Abdominal Bruit] : a ~M bruit was not heard ~T in the abdomen [Pedal Pulses Present] : the pedal pulses are present [No Edema] : there was no peripheral edema [No Palpable Aorta] : no palpable aorta [No Extremity Clubbing/Cyanosis] : no extremity clubbing/cyanosis [Soft] : abdomen soft [Non Tender] : non-tender [Non-distended] : non-distended [No HSM] : no HSM [No Masses] : no abdominal mass palpated [Normal Bowel Sounds] : normal bowel sounds [Normal Supraclavicular Nodes] : no supraclavicular lymphadenopathy [Normal Posterior Cervical Nodes] : no posterior cervical lymphadenopathy [Normal Anterior Cervical Nodes] : no anterior cervical lymphadenopathy [No CVA Tenderness] : no CVA  tenderness [No Spinal Tenderness] : no spinal tenderness [No Joint Swelling] : no joint swelling [Grossly Normal Strength/Tone] : grossly normal strength/tone [No Rash] : no rash [Coordination Grossly Intact] : coordination grossly intact [No Focal Deficits] : no focal deficits [Normal Gait] : normal gait [Deep Tendon Reflexes (DTR)] : deep tendon reflexes were 2+ and symmetric [Speech Grossly Normal] : speech grossly normal [Memory Grossly Normal] : memory grossly normal [Normal Affect] : the affect was normal [Alert and Oriented x3] : oriented to person, place, and time [Normal Mood] : the mood was normal [Normal Insight/Judgement] : insight and judgment were intact [Normal Nasal Mucosa] : the nasal mucosa was normal [Declined Breast Exam] : declined breast exam  [No Hernias] : no hernias [Kyphosis] : kyphosis [Scoliosis] : no scoliosis [Acne] : no acne [de-identified] : spider veins bilateral lower extremity [de-identified] : obese [FreeTextEntry1] : done by gastroenterologist [de-identified] : kyphoscoliosis

## 2022-11-11 NOTE — ASSESSMENT
[FreeTextEntry1] : Patient is a 71 year old female with a past medical history as above who presents for fasting blood work and AWV.\par

## 2022-11-11 NOTE — HEALTH RISK ASSESSMENT
[Never] : Never [Yes] : Yes [1 or 2 (0 pts)] : 1 or 2 (0 points) [Never (0 pts)] : Never (0 points) [No] : In the past 12 months have you used drugs other than those required for medical reasons? No [0] : 2) Feeling down, depressed, or hopeless: Not at all (0) [2 - 3 times a week (3 pts)] : 2 - 3  times a week (3 points) [PHQ-2 Negative - No further assessment needed] : PHQ-2 Negative - No further assessment needed [Audit-CScore] : 3 [de-identified] : Walking.  [de-identified] : Regular.  [XYA2Qpnwy] : 0 [MammogramDate] : 02/19 [MammogramComments] : No mammographic or sonographic evidence of malignancy.  [PapSmearComments] : S/p hysterectomy.  [BoneDensityDate] : 01/19 [BoneDensityComments] : Osteopenia.  [ColonoscopyDate] : 04/21 [ColonoscopyComments] : 3 mm polyp in distal rectum.

## 2022-11-13 ENCOUNTER — NON-APPOINTMENT (OUTPATIENT)
Age: 72
End: 2022-11-13

## 2022-11-13 LAB
24R-OH-CALCIDIOL SERPL-MCNC: 54.3 PG/ML
25(OH)D3 SERPL-MCNC: 73.2 NG/ML
ALBUMIN SERPL ELPH-MCNC: 4.9 G/DL
ALP BLD-CCNC: 64 U/L
ALT SERPL-CCNC: 11 U/L
ANION GAP SERPL CALC-SCNC: 12 MMOL/L
APPEARANCE: CLEAR
AST SERPL-CCNC: 14 U/L
BASOPHILS # BLD AUTO: 0.04 K/UL
BASOPHILS NFR BLD AUTO: 0.9 %
BILIRUB SERPL-MCNC: 0.4 MG/DL
BILIRUBIN URINE: NEGATIVE
BLOOD URINE: NEGATIVE
BUN SERPL-MCNC: 19 MG/DL
CALCIUM SERPL-MCNC: 9.5 MG/DL
CHLORIDE SERPL-SCNC: 106 MMOL/L
CHOLEST SERPL-MCNC: 134 MG/DL
CO2 SERPL-SCNC: 23 MMOL/L
COLOR: NORMAL
COVID-19 NUCLEOCAPSID  GAM ANTIBODY INTERPRETATION: NEGATIVE
COVID-19 SPIKE DOMAIN ANTIBODY INTERPRETATION: POSITIVE
CREAT SERPL-MCNC: 0.69 MG/DL
CRP SERPL HS-MCNC: 0.86 MG/L
EGFR: 92 ML/MIN/1.73M2
EOSINOPHIL # BLD AUTO: 0.08 K/UL
EOSINOPHIL NFR BLD AUTO: 1.9 %
ESTIMATED AVERAGE GLUCOSE: 105 MG/DL
GLUCOSE QUALITATIVE U: NEGATIVE
GLUCOSE SERPL-MCNC: 98 MG/DL
HBA1C MFR BLD HPLC: 5.3 %
HCT VFR BLD CALC: 39.2 %
HDLC SERPL-MCNC: 56 MG/DL
HGB BLD-MCNC: 12.5 G/DL
IMM GRANULOCYTES NFR BLD AUTO: 0 %
IRON SERPL-MCNC: 85 UG/DL
KETONES URINE: NEGATIVE
LDLC SERPL CALC-MCNC: 60 MG/DL
LEUKOCYTE ESTERASE URINE: NEGATIVE
LYMPHOCYTES # BLD AUTO: 1.21 K/UL
LYMPHOCYTES NFR BLD AUTO: 28.5 %
MAGNESIUM SERPL-MCNC: 2 MG/DL
MAN DIFF?: NORMAL
MCHC RBC-ENTMCNC: 31.9 GM/DL
MCHC RBC-ENTMCNC: 31.9 PG
MCV RBC AUTO: 100 FL
MONOCYTES # BLD AUTO: 0.32 K/UL
MONOCYTES NFR BLD AUTO: 7.5 %
NEUTROPHILS # BLD AUTO: 2.6 K/UL
NEUTROPHILS NFR BLD AUTO: 61.2 %
NITRITE URINE: NEGATIVE
NONHDLC SERPL-MCNC: 78 MG/DL
PH URINE: 5.5
PLATELET # BLD AUTO: 291 K/UL
POTASSIUM SERPL-SCNC: 4 MMOL/L
PROT SERPL-MCNC: 7.3 G/DL
PROTEIN URINE: NEGATIVE
RBC # BLD: 3.92 M/UL
RBC # FLD: 13.2 %
SARS-COV-2 AB SERPL IA-ACNC: >250 U/ML
SARS-COV-2 AB SERPL QL IA: 0.08 INDEX
SODIUM SERPL-SCNC: 142 MMOL/L
SPECIFIC GRAVITY URINE: >=1.03
TRIGL SERPL-MCNC: 88 MG/DL
TSH SERPL-ACNC: 0.94 UIU/ML
UROBILINOGEN URINE: NORMAL
VIT B12 SERPL-MCNC: 1077 PG/ML
WBC # FLD AUTO: 4.25 K/UL

## 2022-11-16 LAB
VIT A SERPL-MCNC: 43.1 UG/DL
VIT B1 SERPL-MCNC: 100.7 NMOL/L
VIT C SERPL-MCNC: 0.4 MG/DL

## 2022-11-26 LAB — MENADIONE SERPL-MCNC: 0.4 NG/ML

## 2022-11-29 LAB — VIT B2 SERPL-MCNC: 237 UG/L

## 2023-04-12 ENCOUNTER — RX RENEWAL (OUTPATIENT)
Age: 73
End: 2023-04-12

## 2023-05-09 RX ORDER — DEXLANSOPRAZOLE 60 MG/1
60 CAPSULE, DELAYED RELEASE ORAL
Qty: 90 | Refills: 3 | Status: DISCONTINUED | COMMUNITY
Start: 2020-11-24 | End: 2023-05-09

## 2023-05-09 RX ORDER — PEG-3350, SODIUM SULFATE, SODIUM CHLORIDE, POTASSIUM CHLORIDE, SODIUM ASCORBATE AND ASCORBIC ACID 7.5-2.691G
100 KIT ORAL
Qty: 1 | Refills: 0 | Status: DISCONTINUED | COMMUNITY
Start: 2021-01-12 | End: 2023-05-09

## 2023-05-09 RX ORDER — POLYETHYLENE GLYCOL 3350 AND ELECTROLYTES WITH LEMON FLAVOR 236; 22.74; 6.74; 5.86; 2.97 G/4L; G/4L; G/4L; G/4L; G/4L
236 POWDER, FOR SOLUTION ORAL
Qty: 1 | Refills: 0 | Status: DISCONTINUED | COMMUNITY
Start: 2021-01-07 | End: 2023-05-09

## 2023-05-10 ENCOUNTER — APPOINTMENT (OUTPATIENT)
Dept: INTERNAL MEDICINE | Facility: CLINIC | Age: 73
End: 2023-05-10
Payer: MEDICARE

## 2023-05-10 VITALS
BODY MASS INDEX: 30.78 KG/M2 | SYSTOLIC BLOOD PRESSURE: 124 MMHG | RESPIRATION RATE: 17 BRPM | WEIGHT: 163 LBS | HEART RATE: 89 BPM | HEIGHT: 61 IN | TEMPERATURE: 98.3 F | DIASTOLIC BLOOD PRESSURE: 70 MMHG | OXYGEN SATURATION: 98 %

## 2023-05-10 DIAGNOSIS — R63.8 OTHER SYMPTOMS AND SIGNS CONCERNING FOOD AND FLUID INTAKE: ICD-10-CM

## 2023-05-10 DIAGNOSIS — Z20.822 CONTACT WITH AND (SUSPECTED) EXPOSURE TO COVID-19: ICD-10-CM

## 2023-05-10 DIAGNOSIS — K76.0 FATTY (CHANGE OF) LIVER, NOT ELSEWHERE CLASSIFIED: ICD-10-CM

## 2023-05-10 PROCEDURE — 36415 COLL VENOUS BLD VENIPUNCTURE: CPT

## 2023-05-10 PROCEDURE — 99214 OFFICE O/P EST MOD 30 MIN: CPT | Mod: 25

## 2023-05-10 NOTE — PHYSICAL EXAM
[No Acute Distress] : no acute distress [Well Nourished] : well nourished [Well Developed] : well developed [Well-Appearing] : well-appearing [Normal Voice/Communication] : normal voice/communication [Normal Sclera/Conjunctiva] : normal sclera/conjunctiva [PERRL] : pupils equal round and reactive to light [EOMI] : extraocular movements intact [Normal Outer Ear/Nose] : the outer ears and nose were normal in appearance [Normal Oropharynx] : the oropharynx was normal [Normal TMs] : both tympanic membranes were normal [Normal Nasal Mucosa] : the nasal mucosa was normal [No JVD] : no jugular venous distention [No Lymphadenopathy] : no lymphadenopathy [Supple] : supple [Thyroid Normal, No Nodules] : the thyroid was normal and there were no nodules present [No Respiratory Distress] : no respiratory distress  [No Accessory Muscle Use] : no accessory muscle use [Clear to Auscultation] : lungs were clear to auscultation bilaterally [Normal Rate] : normal rate  [Regular Rhythm] : with a regular rhythm [Normal S1, S2] : normal S1 and S2 [No Murmur] : no murmur heard [No Carotid Bruits] : no carotid bruits [No Abdominal Bruit] : a ~M bruit was not heard ~T in the abdomen [Pedal Pulses Present] : the pedal pulses are present [No Edema] : there was no peripheral edema [No Palpable Aorta] : no palpable aorta [No Extremity Clubbing/Cyanosis] : no extremity clubbing/cyanosis [Soft] : abdomen soft [Non Tender] : non-tender [Non-distended] : non-distended [No Masses] : no abdominal mass palpated [No HSM] : no HSM [Normal Bowel Sounds] : normal bowel sounds [No Hernias] : no hernias [No CVA Tenderness] : no CVA  tenderness [No Spinal Tenderness] : no spinal tenderness [Kyphosis] : kyphosis [No Joint Swelling] : no joint swelling [Grossly Normal Strength/Tone] : grossly normal strength/tone [No Rash] : no rash [Coordination Grossly Intact] : coordination grossly intact [No Focal Deficits] : no focal deficits [Normal Gait] : normal gait [Deep Tendon Reflexes (DTR)] : deep tendon reflexes were 2+ and symmetric [Speech Grossly Normal] : speech grossly normal [Memory Grossly Normal] : memory grossly normal [Normal Affect] : the affect was normal [Alert and Oriented x3] : oriented to person, place, and time [Normal Mood] : the mood was normal [Normal Insight/Judgement] : insight and judgment were intact [Normal Supraclavicular Nodes] : no supraclavicular lymphadenopathy [Normal Posterior Cervical Nodes] : no posterior cervical lymphadenopathy [Normal Anterior Cervical Nodes] : no anterior cervical lymphadenopathy [Scoliosis] : no scoliosis [Acne] : no acne [de-identified] : spider veins bilateral lower extremity [de-identified] : obese [de-identified] : kyphoscoliosis

## 2023-05-10 NOTE — HISTORY OF PRESENT ILLNESS
[Spouse] : spouse [FreeTextEntry1] : fasting blood work and general follow-up\par  [de-identified] : Patient is a 72 year old female with a past medical history as below who presents for fasting blood work and general follow-up. Patient states she is taking all medications as prescribed and denies any adverse reactions or side effects. She denies lightheadedness or dizziness on Losartan Potassium. She denies diffuse arthralgias or myalgias on Atorvastatin Calcium. GERD has been well-managed on Dexilant.\par She c/o pain in her r foot bunyon and she states she has seen a podiatrist and was prescribe a steroid injection every 2 weeks. She takes OTC anti-inflammatory medications for it and it provides little relief. She denies any breakthrough acid reflux on the dexlansoperazole. Pt reports she is walking a lot more and using the stationary bike and has lost 4 pounds. \par Patient is vaccinated against COVID-19. Pt inquired about COVID- Antibodies in her bloodwork.

## 2023-05-10 NOTE — REVIEW OF SYSTEMS
[Recent Change In Weight] : ~T recent weight change [Joint Pain] : joint pain [Joint Stiffness] : joint stiffness [Negative] : Psychiatric [FreeTextEntry2] : -4 lb [FreeTextEntry9] : right bunyon pain

## 2023-05-10 NOTE — ASSESSMENT
[FreeTextEntry1] : Patient is a 72 year old female with a past medical history as above who presents for fasting blood work and AWV.\par

## 2023-05-10 NOTE — PLAN
[FreeTextEntry1] : Constitutional\par history of fatigue - check CBC, Iron/TIBC, Serum Ferritin, CMP, TSH, and vitamin panel\par Cardiology \par benign essential hypertension - continue Losartan Potassium 25mg p.o.q.d and metoprolol ER 25 mg qd. as directed - check CMP - continue low sodium diet and weight loss; will continue to monitor BP\par Continue Propranolol HCl 20mg p.o. p.r.n. as directed by Dr. Wilhelm for palpitations/tachycardia\par hyperlipidemia - continue Atorvastatin Calcium 20mg p.o.q.d. as directed - continue low cholesterol/low fat diet - check FLP and LFTs\par Endocrinology\par obesity - continue low carbohydrate diet; previously recommended increasing CV exercise - check hemoglobin A1C and TSH\par Continue Vitamin D-3 5000 IU p.o.q.d. with a meal as directed - check Vitamin D \par Gastroenterology\par GERD/Castillo's esophagus - continue Dexilant 60mg p.o.q.d. as directed - continue antireflux measures - continue to follow up with gastroenterologist, Dr. Erazo\par chronic hepatitis C - s/p treatment; hepatitis C in remission - follow up with hepatologist, Dr. Connolly as necessary \par Musculoskeletal\par osteopenia - continue Calcium 600mg BID p.o.q.d. and Vitamin D 5000 IU p.o.q.d. with meals as directed\par Hematology\par history of anemia - check CBC, Iron/TIBC, and Serum Ferritin \par Psychiatry\par anxiety - previously advised to take dose of Propranolol HCl 20mg when symptoms occur (panic, elevated heart rate)\par Immunization/Infectious Disease-influenza vaccine high dose given today\par obesity- check A1C\par Encounter for screening for nutritional disiease -order- \par check iron total, magnesium, Potassium, vitamins A, B1, B2, B12, B6, C, D, K \par FamHx of Cardiac disorder-\par Order C- reactive protein \par Pt to bring over report of mammo next visit. \par \par \par check female panel, hemoglobin A1C, vitamin panel, Iron/TIBC, Serum Ferritin, Cardiac CRP, COVID-19 Raghavendra Domain Antibody, and COVID-19 Nucleocapsid Antibody, Flu shot administered. Check  EKG.\par \par

## 2023-05-10 NOTE — HEALTH RISK ASSESSMENT
[Yes] : Yes [2 - 3 times a week (3 pts)] : 2 - 3  times a week (3 points) [1 or 2 (0 pts)] : 1 or 2 (0 points) [Never (0 pts)] : Never (0 points) [No] : In the past 12 months have you used drugs other than those required for medical reasons? No [Audit-CScore] : 3 [de-identified] : Walking. Stationary Bike. [de-identified] : Regular.  [MammogramDate] : 02/22 [MammogramComments] : Pt to bring the report.  [PapSmearComments] : S/p hysterectomy.  [BoneDensityDate] : 09/2021 [BoneDensityComments] : Osteopenia.  [ColonoscopyDate] : 04/2021 [ColonoscopyComments] : 3 mm polyp in distal rectum.

## 2023-05-10 NOTE — ADDENDUM
[FreeTextEntry1] : This note was written by Neli Patel on 05/10/2023 actively solely Aaliyah Lopez DO.\par \par All medical record entries made by this scribe at my, Aaliyah Lopez DO direction and personally dictated by me on 05/10/2023. I have personally reviewed the chart and agree that the record reflects my personal performance of the history, physical exam, assessment, and plan. \par \par

## 2023-05-24 ENCOUNTER — NON-APPOINTMENT (OUTPATIENT)
Age: 73
End: 2023-05-24

## 2023-05-24 LAB
25(OH)D3 SERPL-MCNC: 54.6 NG/ML
ALBUMIN SERPL ELPH-MCNC: 4.8 G/DL
ALP BLD-CCNC: 63 U/L
ALT SERPL-CCNC: 11 U/L
ANION GAP SERPL CALC-SCNC: 14 MMOL/L
AST SERPL-CCNC: 16 U/L
BASOPHILS # BLD AUTO: 0.04 K/UL
BASOPHILS NFR BLD AUTO: 1 %
BILIRUB SERPL-MCNC: 0.6 MG/DL
BUN SERPL-MCNC: 18 MG/DL
CALCIUM SERPL-MCNC: 9.8 MG/DL
CHLORIDE SERPL-SCNC: 105 MMOL/L
CHOLEST SERPL-MCNC: 139 MG/DL
CO2 SERPL-SCNC: 24 MMOL/L
COVID-19 SPIKE DOMAIN ANTIBODY INTERPRETATION: POSITIVE
CREAT SERPL-MCNC: 0.7 MG/DL
CRP SERPL HS-MCNC: 0.75 MG/L
EGFR: 91 ML/MIN/1.73M2
EOSINOPHIL # BLD AUTO: 0.07 K/UL
EOSINOPHIL NFR BLD AUTO: 1.7 %
ESTIMATED AVERAGE GLUCOSE: 97 MG/DL
GLUCOSE SERPL-MCNC: 100 MG/DL
HBA1C MFR BLD HPLC: 5 %
HCT VFR BLD CALC: 41.4 %
HDLC SERPL-MCNC: 60 MG/DL
HGB BLD-MCNC: 12.8 G/DL
IMM GRANULOCYTES NFR BLD AUTO: 0.2 %
IRON SERPL-MCNC: 100 UG/DL
LDLC SERPL CALC-MCNC: 60 MG/DL
LYMPHOCYTES # BLD AUTO: 1.29 K/UL
LYMPHOCYTES NFR BLD AUTO: 31.4 %
MAGNESIUM SERPL-MCNC: 2.1 MG/DL
MAN DIFF?: NORMAL
MCHC RBC-ENTMCNC: 30.9 GM/DL
MCHC RBC-ENTMCNC: 31.4 PG
MCV RBC AUTO: 101.5 FL
MONOCYTES # BLD AUTO: 0.41 K/UL
MONOCYTES NFR BLD AUTO: 10 %
NEUTROPHILS # BLD AUTO: 2.29 K/UL
NEUTROPHILS NFR BLD AUTO: 55.7 %
NONHDLC SERPL-MCNC: 78 MG/DL
PLATELET # BLD AUTO: 281 K/UL
POTASSIUM SERPL-SCNC: 4.2 MMOL/L
PROT SERPL-MCNC: 7.4 G/DL
RBC # BLD: 4.08 M/UL
RBC # FLD: 13.8 %
SARS-COV-2 AB SERPL IA-ACNC: >250 U/ML
SODIUM SERPL-SCNC: 143 MMOL/L
T3FREE SERPL-MCNC: 3.24 PG/ML
T4 FREE SERPL-MCNC: 1.1 NG/DL
THYROGLOB AB SERPL-ACNC: <20 IU/ML
THYROPEROXIDASE AB SERPL IA-ACNC: <10 IU/ML
TRIGL SERPL-MCNC: 91 MG/DL
TSH SERPL-ACNC: 1.41 UIU/ML
VIT A SERPL-MCNC: 48.7 UG/DL
VIT B1 SERPL-MCNC: 63.5 NMOL/L
VIT B12 SERPL-MCNC: 1323 PG/ML
VIT C SERPL-MCNC: 1 MG/DL
WBC # FLD AUTO: 4.11 K/UL

## 2023-05-30 ENCOUNTER — APPOINTMENT (OUTPATIENT)
Dept: GASTROENTEROLOGY | Facility: CLINIC | Age: 73
End: 2023-05-30
Payer: MEDICARE

## 2023-05-30 VITALS
BODY MASS INDEX: 30.4 KG/M2 | HEIGHT: 61 IN | TEMPERATURE: 97.3 F | WEIGHT: 161 LBS | OXYGEN SATURATION: 98 % | SYSTOLIC BLOOD PRESSURE: 130 MMHG | HEART RATE: 75 BPM | DIASTOLIC BLOOD PRESSURE: 86 MMHG

## 2023-05-30 DIAGNOSIS — Z00.00 ENCOUNTER FOR GENERAL ADULT MEDICAL EXAMINATION W/OUT ABNORMAL FINDINGS: ICD-10-CM

## 2023-05-30 DIAGNOSIS — K22.70 BARRETT'S ESOPHAGUS W/OUT DYSPLASIA: ICD-10-CM

## 2023-05-30 PROCEDURE — 99213 OFFICE O/P EST LOW 20 MIN: CPT

## 2023-07-06 ENCOUNTER — OUTPATIENT (OUTPATIENT)
Dept: OUTPATIENT SERVICES | Facility: HOSPITAL | Age: 73
LOS: 1 days | End: 2023-07-06
Payer: MEDICARE

## 2023-07-06 ENCOUNTER — APPOINTMENT (OUTPATIENT)
Dept: ULTRASOUND IMAGING | Facility: CLINIC | Age: 73
End: 2023-07-06
Payer: MEDICARE

## 2023-07-06 DIAGNOSIS — Z00.00 ENCOUNTER FOR GENERAL ADULT MEDICAL EXAMINATION WITHOUT ABNORMAL FINDINGS: ICD-10-CM

## 2023-07-06 PROCEDURE — 76700 US EXAM ABDOM COMPLETE: CPT

## 2023-07-06 PROCEDURE — 76700 US EXAM ABDOM COMPLETE: CPT | Mod: 26

## 2023-10-10 ENCOUNTER — APPOINTMENT (OUTPATIENT)
Dept: GASTROENTEROLOGY | Facility: AMBULATORY MEDICAL SERVICES | Age: 73
End: 2023-10-10
Payer: MEDICARE

## 2023-10-10 PROCEDURE — 43239 EGD BIOPSY SINGLE/MULTIPLE: CPT

## 2023-11-15 ENCOUNTER — APPOINTMENT (OUTPATIENT)
Dept: INTERNAL MEDICINE | Facility: CLINIC | Age: 73
End: 2023-11-15
Payer: MEDICARE

## 2023-11-15 VITALS
RESPIRATION RATE: 16 BRPM | TEMPERATURE: 97.5 F | HEART RATE: 80 BPM | OXYGEN SATURATION: 98 % | HEIGHT: 61 IN | DIASTOLIC BLOOD PRESSURE: 70 MMHG | SYSTOLIC BLOOD PRESSURE: 128 MMHG

## 2023-11-15 DIAGNOSIS — E78.5 HYPERLIPIDEMIA, UNSPECIFIED: ICD-10-CM

## 2023-11-15 DIAGNOSIS — Z13.21 ENCOUNTER FOR SCREENING FOR NUTRITIONAL DISORDER: ICD-10-CM

## 2023-11-15 DIAGNOSIS — R41.3 OTHER AMNESIA: ICD-10-CM

## 2023-11-15 PROCEDURE — 99214 OFFICE O/P EST MOD 30 MIN: CPT | Mod: 25

## 2023-11-15 PROCEDURE — 36415 COLL VENOUS BLD VENIPUNCTURE: CPT

## 2023-11-16 PROBLEM — E78.5 HYPERLIPIDEMIA, UNSPECIFIED HYPERLIPIDEMIA TYPE: Status: ACTIVE | Noted: 2018-10-11

## 2023-11-16 PROBLEM — Z13.21 ENCOUNTER FOR SCREENING FOR NUTRITIONAL DISORDER: Status: ACTIVE | Noted: 2022-05-30

## 2023-11-16 PROBLEM — R41.3 MEMORY LOSS: Status: ACTIVE | Noted: 2023-11-16

## 2023-11-19 LAB
25(OH)D3 SERPL-MCNC: 45 NG/ML
ALBUMIN SERPL ELPH-MCNC: 4.9 G/DL
ALP BLD-CCNC: 61 U/L
ALT SERPL-CCNC: 10 U/L
ANION GAP SERPL CALC-SCNC: 12 MMOL/L
AST SERPL-CCNC: 16 U/L
BASOPHILS # BLD AUTO: 0.02 K/UL
BASOPHILS NFR BLD AUTO: 0.4 %
BILIRUB SERPL-MCNC: 0.5 MG/DL
BUN SERPL-MCNC: 17 MG/DL
CALCIUM SERPL-MCNC: 10 MG/DL
CHLORIDE SERPL-SCNC: 106 MMOL/L
CHOLEST SERPL-MCNC: 131 MG/DL
CO2 SERPL-SCNC: 26 MMOL/L
CREAT SERPL-MCNC: 0.69 MG/DL
CRP SERPL HS-MCNC: 0.71 MG/L
EGFR: 91 ML/MIN/1.73M2
EOSINOPHIL # BLD AUTO: 0.08 K/UL
EOSINOPHIL NFR BLD AUTO: 1.7 %
ESTIMATED AVERAGE GLUCOSE: 97 MG/DL
GLUCOSE SERPL-MCNC: 95 MG/DL
HBA1C MFR BLD HPLC: 5 %
HCT VFR BLD CALC: 40.3 %
HDLC SERPL-MCNC: 52 MG/DL
HGB BLD-MCNC: 12.5 G/DL
IMM GRANULOCYTES NFR BLD AUTO: 0.2 %
IRON SERPL-MCNC: 120 UG/DL
LDLC SERPL CALC-MCNC: 58 MG/DL
LYMPHOCYTES # BLD AUTO: 1.37 K/UL
LYMPHOCYTES NFR BLD AUTO: 29.1 %
MAGNESIUM SERPL-MCNC: 2.1 MG/DL
MAN DIFF?: NORMAL
MCHC RBC-ENTMCNC: 31 GM/DL
MCHC RBC-ENTMCNC: 31 PG
MCV RBC AUTO: 100 FL
MONOCYTES # BLD AUTO: 0.4 K/UL
MONOCYTES NFR BLD AUTO: 8.5 %
NEUTROPHILS # BLD AUTO: 2.82 K/UL
NEUTROPHILS NFR BLD AUTO: 60.1 %
NONHDLC SERPL-MCNC: 78 MG/DL
PLATELET # BLD AUTO: 287 K/UL
POTASSIUM SERPL-SCNC: 4.6 MMOL/L
PROT SERPL-MCNC: 7.5 G/DL
RBC # BLD: 4.03 M/UL
RBC # FLD: 13.2 %
SODIUM SERPL-SCNC: 143 MMOL/L
T3FREE SERPL-MCNC: 3.6 PG/ML
T4 FREE SERPL-MCNC: 1.1 NG/DL
THYROGLOB AB SERPL-ACNC: <20 IU/ML
THYROPEROXIDASE AB SERPL IA-ACNC: <10 IU/ML
TRIGL SERPL-MCNC: 116 MG/DL
TSH SERPL-ACNC: 1.3 UIU/ML
VIT B12 SERPL-MCNC: 914 PG/ML
WBC # FLD AUTO: 4.7 K/UL

## 2023-12-04 LAB
MENADIONE SERPL-MCNC: 0.71 NG/ML
VIT A SERPL-MCNC: 49.5 UG/DL
VIT B1 SERPL-MCNC: 157.5 NMOL/L
VIT B2 SERPL-MCNC: 297 UG/L
VIT B6 SERPL-MCNC: 14.2 UG/L

## 2024-04-08 ENCOUNTER — RX RENEWAL (OUTPATIENT)
Age: 74
End: 2024-04-08

## 2024-04-08 RX ORDER — DEXLANSOPRAZOLE 60 MG/1
60 CAPSULE, DELAYED RELEASE ORAL
Qty: 90 | Refills: 3 | Status: ACTIVE | COMMUNITY
Start: 2023-04-12 | End: 1900-01-01

## 2024-05-15 ENCOUNTER — APPOINTMENT (OUTPATIENT)
Dept: INTERNAL MEDICINE | Facility: CLINIC | Age: 74
End: 2024-05-15
Payer: MEDICARE

## 2024-05-15 VITALS
HEART RATE: 76 BPM | WEIGHT: 162.56 LBS | OXYGEN SATURATION: 95 % | TEMPERATURE: 97.5 F | HEIGHT: 61 IN | BODY MASS INDEX: 30.69 KG/M2 | SYSTOLIC BLOOD PRESSURE: 120 MMHG | RESPIRATION RATE: 16 BRPM | DIASTOLIC BLOOD PRESSURE: 78 MMHG

## 2024-05-15 DIAGNOSIS — D64.9 ANEMIA, UNSPECIFIED: ICD-10-CM

## 2024-05-15 DIAGNOSIS — I10 ESSENTIAL (PRIMARY) HYPERTENSION: ICD-10-CM

## 2024-05-15 DIAGNOSIS — K21.00 GASTRO-ESOPHAGEAL REFLUX DISEASE WITH ESOPHAGITIS, WITHOUT BLEEDING: ICD-10-CM

## 2024-05-15 DIAGNOSIS — E66.9 OVERWEIGHT: ICD-10-CM

## 2024-05-15 DIAGNOSIS — E66.3 OVERWEIGHT: ICD-10-CM

## 2024-05-15 DIAGNOSIS — Z91.89 OTHER SPECIFIED PERSONAL RISK FACTORS, NOT ELSEWHERE CLASSIFIED: ICD-10-CM

## 2024-05-15 DIAGNOSIS — R92.30 DENSE BREASTS, UNSPECIFIED: ICD-10-CM

## 2024-05-15 PROCEDURE — 99214 OFFICE O/P EST MOD 30 MIN: CPT

## 2024-05-15 PROCEDURE — 36415 COLL VENOUS BLD VENIPUNCTURE: CPT

## 2024-05-15 PROCEDURE — G2211 COMPLEX E/M VISIT ADD ON: CPT

## 2024-05-15 NOTE — PLAN
[FreeTextEntry1] : GYN RX US breast - has dense breast and family hx of breast cancer (mother)  Neurology memory loss -previously  referred to neurologists Dr. Celestino Sanchez and Dr. Franklyn Mitchell at [facility] for further evaluation  Constitutional history of fatigue - check CBC, Iron/TIBC, Serum Ferritin, CMP, TSH, and vitamin panel Cardiology benign essential hypertension - continue Losartan Potassium 25mg p.o.q.d and metoprolol ER 25 mg qd. as directed - check CMP - continue low sodium diet and weight loss; will continue to monitor BP Continue Propranolol HCl 20mg p.o. p.r.n. as directed by Dr. Wilhelm for palpitations/tachycardia hyperlipidemia - continue Atorvastatin Calcium 20mg p.o.q.d. as directed - continue low cholesterol/low fat diet - check FLP and LFTs FamHx of cardiac disorder- check C- reactive protein Venous insufficiency - considering wearing compression stockings - elevate legs when in a seated position - focus on low sodium diet  Endocrinology obesity - continue low carbohydrate diet; previously recommended increasing CV exercise - check hemoglobin A1C and TSH Continue Vitamin D-3 5000 IU p.o.q.d. with a meal as directed - check Vitamin D Gastroenterology GERD/Castillo's esophagus - continue Dexilant 60mg p.o.q.d. as directed - continue antireflux measures - continue to follow up with gastroenterologist, Dr. Erazo chronic hepatitis C - s/p treatment; hepatitis C in remission - follow up with hepatologist, Dr. Connolly as necessary Musculoskeletal osteopenia - continue Calcium 600mg BID p.o.q.d. and Vitamin D 5000 IU p.o.q.d. with meals as directed Hematology history of anemia - check CBC, Iron/TIBC, and Serum Ferritin Psychiatry anxiety - previously advised to take dose of Propranolol HCl 20mg when symptoms occur (panic, elevated heart rate)  check hemoglobin A1C, full thyroid panel, CBC, CMP, C-Reactive Protein, Iron, FLP, Magnesium, Potassium, and vitamin panel

## 2024-05-15 NOTE — PHYSICAL EXAM
[No Acute Distress] : no acute distress [Well Nourished] : well nourished [Well Developed] : well developed [Well-Appearing] : well-appearing [Normal Voice/Communication] : normal voice/communication [Normal Sclera/Conjunctiva] : normal sclera/conjunctiva [PERRL] : pupils equal round and reactive to light [EOMI] : extraocular movements intact [Normal Outer Ear/Nose] : the outer ears and nose were normal in appearance [Normal Oropharynx] : the oropharynx was normal [Normal TMs] : both tympanic membranes were normal [Normal Nasal Mucosa] : the nasal mucosa was normal [No JVD] : no jugular venous distention [No Lymphadenopathy] : no lymphadenopathy [Supple] : supple [Thyroid Normal, No Nodules] : the thyroid was normal and there were no nodules present [No Respiratory Distress] : no respiratory distress  [No Accessory Muscle Use] : no accessory muscle use [Clear to Auscultation] : lungs were clear to auscultation bilaterally [Normal Rate] : normal rate  [Regular Rhythm] : with a regular rhythm [Normal S1, S2] : normal S1 and S2 [No Murmur] : no murmur heard [No Carotid Bruits] : no carotid bruits [No Abdominal Bruit] : a ~M bruit was not heard ~T in the abdomen [Pedal Pulses Present] : the pedal pulses are present [No Palpable Aorta] : no palpable aorta [Soft] : abdomen soft [Non Tender] : non-tender [Non-distended] : non-distended [No Masses] : no abdominal mass palpated [No HSM] : no HSM [Normal Bowel Sounds] : normal bowel sounds [Normal Supraclavicular Nodes] : no supraclavicular lymphadenopathy [Normal Posterior Cervical Nodes] : no posterior cervical lymphadenopathy [Normal Anterior Cervical Nodes] : no anterior cervical lymphadenopathy [No CVA Tenderness] : no CVA  tenderness [No Spinal Tenderness] : no spinal tenderness [No Joint Swelling] : no joint swelling [Grossly Normal Strength/Tone] : grossly normal strength/tone [No Rash] : no rash [Coordination Grossly Intact] : coordination grossly intact [No Focal Deficits] : no focal deficits [Normal Gait] : normal gait [Deep Tendon Reflexes (DTR)] : deep tendon reflexes were 2+ and symmetric [Speech Grossly Normal] : speech grossly normal [Normal Affect] : the affect was normal [Alert and Oriented x3] : oriented to person, place, and time [Normal Mood] : the mood was normal [Normal Insight/Judgement] : insight and judgment were intact [Normal] : affect was normal and insight and judgment were intact [Kyphosis] : kyphosis [Scoliosis] : no scoliosis [Acne] : no acne [de-identified] : pitting edema in bl LE R>L; + spider veinsbl [de-identified] : overweight

## 2024-05-15 NOTE — ASSESSMENT
[FreeTextEntry1] : Patient is a 73-year-old female with a past medical history as above who presents for fasting blood work and general follow-up.

## 2024-05-15 NOTE — HISTORY OF PRESENT ILLNESS
[Spouse] : spouse [FreeTextEntry1] : fasting blood work and general follow-up [de-identified] : Patient is a 73-year-old female with a past medical history as below who presents for fasting blood work and general follow-up. Patient states she is taking all medications as prescribed and denies any adverse reactions or side effects. She denies lightheadedness or dizziness on Losartan Potassium. She denies diffuse arthralgias or myalgias on Atorvastatin Calcium. GERD has been well-managed on Dexilant.  The patient's weight is stable. She reports she maintains her diet and walks or bikes 5-6 x a week.  The patient had a carotid doppler 3/11/2024 which showed that she had less than 50% stenosis bl. She also completed a mammogram 1/22/2024 which was nl and a DEXA 1/22/2024 that showed osteopenia at the spine w/ a T-score of -1.7. Bone mineral density was normall at the hip. She continues to take calcium and her Vit D daily.   The patient is inquiring about a RX for a breast sonogram. She has dense breasts and a family hx of breast cancer (mother).

## 2024-05-20 ENCOUNTER — NON-APPOINTMENT (OUTPATIENT)
Age: 74
End: 2024-05-20

## 2024-05-20 LAB
25(OH)D3 SERPL-MCNC: 56.6 NG/ML
ALBUMIN SERPL ELPH-MCNC: 4.6 G/DL
ALP BLD-CCNC: 68 U/L
ALT SERPL-CCNC: 11 U/L
ANION GAP SERPL CALC-SCNC: 11 MMOL/L
AST SERPL-CCNC: 15 U/L
BASOPHILS # BLD AUTO: 0.04 K/UL
BASOPHILS NFR BLD AUTO: 0.9 %
BILIRUB SERPL-MCNC: 0.4 MG/DL
BUN SERPL-MCNC: 18 MG/DL
CALCIUM SERPL-MCNC: 9.8 MG/DL
CHLORIDE SERPL-SCNC: 105 MMOL/L
CHOLEST SERPL-MCNC: 142 MG/DL
CO2 SERPL-SCNC: 26 MMOL/L
CREAT SERPL-MCNC: 0.68 MG/DL
CRP SERPL HS-MCNC: 0.65 MG/L
EGFR: 91 ML/MIN/1.73M2
EOSINOPHIL # BLD AUTO: 0.08 K/UL
EOSINOPHIL NFR BLD AUTO: 1.9 %
ESTIMATED AVERAGE GLUCOSE: 105 MG/DL
GLUCOSE SERPL-MCNC: 94 MG/DL
HBA1C MFR BLD HPLC: 5.3 %
HCT VFR BLD CALC: 37.3 %
HDLC SERPL-MCNC: 55 MG/DL
HGB BLD-MCNC: 11.9 G/DL
IMM GRANULOCYTES NFR BLD AUTO: 0.2 %
IRON SERPL-MCNC: 79 UG/DL
LDLC SERPL CALC-MCNC: 69 MG/DL
LYMPHOCYTES # BLD AUTO: 1.3 K/UL
LYMPHOCYTES NFR BLD AUTO: 30.7 %
MAGNESIUM SERPL-MCNC: 2.2 MG/DL
MAN DIFF?: NORMAL
MCHC RBC-ENTMCNC: 31 PG
MCHC RBC-ENTMCNC: 31.9 GM/DL
MCV RBC AUTO: 97.1 FL
MENADIONE SERPL-MCNC: 1.55 NG/ML
MONOCYTES # BLD AUTO: 0.39 K/UL
MONOCYTES NFR BLD AUTO: 9.2 %
NEUTROPHILS # BLD AUTO: 2.41 K/UL
NEUTROPHILS NFR BLD AUTO: 57.1 %
NONHDLC SERPL-MCNC: 87 MG/DL
PLATELET # BLD AUTO: 277 K/UL
POTASSIUM SERPL-SCNC: 4.1 MMOL/L
PROT SERPL-MCNC: 7.3 G/DL
RBC # BLD: 3.84 M/UL
RBC # FLD: 13.5 %
SODIUM SERPL-SCNC: 142 MMOL/L
T3FREE SERPL-MCNC: 3.45 PG/ML
T4 FREE SERPL-MCNC: 1.1 NG/DL
THYROGLOB AB SERPL-ACNC: <20 IU/ML
THYROPEROXIDASE AB SERPL IA-ACNC: <10 IU/ML
TRIGL SERPL-MCNC: 94 MG/DL
TSH SERPL-ACNC: 1.33 UIU/ML
VIT A SERPL-MCNC: 48.7 UG/DL
VIT B12 SERPL-MCNC: 1255 PG/ML
VIT B2 SERPL-MCNC: 244 UG/L
VIT B6 SERPL-MCNC: 6.6 UG/L
WBC # FLD AUTO: 4.23 K/UL

## 2024-05-21 LAB — VIT B1 SERPL-MCNC: 106.2 NMOL/L

## 2024-11-14 NOTE — ED ADULT NURSE NOTE - CHIEF COMPLAINT QUOTE
" sent by Urgent Care, FX Lt humerus, to see Ortho" Detail Level: Zone Include Location In Plan?: No Detail Level: Detailed

## 2024-11-15 ENCOUNTER — APPOINTMENT (OUTPATIENT)
Dept: INTERNAL MEDICINE | Facility: CLINIC | Age: 74
End: 2024-11-15

## 2025-02-12 ENCOUNTER — APPOINTMENT (OUTPATIENT)
Dept: INTERNAL MEDICINE | Facility: CLINIC | Age: 75
End: 2025-02-12
Payer: MEDICARE

## 2025-02-12 VITALS
DIASTOLIC BLOOD PRESSURE: 80 MMHG | TEMPERATURE: 98.1 F | BODY MASS INDEX: 31.15 KG/M2 | WEIGHT: 165 LBS | OXYGEN SATURATION: 97 % | HEART RATE: 78 BPM | HEIGHT: 61 IN | SYSTOLIC BLOOD PRESSURE: 124 MMHG

## 2025-02-12 DIAGNOSIS — M79.671 PAIN IN RIGHT FOOT: ICD-10-CM

## 2025-02-12 DIAGNOSIS — D64.9 ANEMIA, UNSPECIFIED: ICD-10-CM

## 2025-02-12 DIAGNOSIS — E78.5 HYPERLIPIDEMIA, UNSPECIFIED: ICD-10-CM

## 2025-02-12 DIAGNOSIS — I10 ESSENTIAL (PRIMARY) HYPERTENSION: ICD-10-CM

## 2025-02-12 DIAGNOSIS — Z13.21 ENCOUNTER FOR SCREENING FOR NUTRITIONAL DISORDER: ICD-10-CM

## 2025-02-12 PROCEDURE — G2211 COMPLEX E/M VISIT ADD ON: CPT

## 2025-02-12 PROCEDURE — 99214 OFFICE O/P EST MOD 30 MIN: CPT

## 2025-02-12 PROCEDURE — 36415 COLL VENOUS BLD VENIPUNCTURE: CPT

## 2025-02-13 LAB
25(OH)D3 SERPL-MCNC: 54.6 NG/ML
ALBUMIN SERPL ELPH-MCNC: 4.5 G/DL
ALP BLD-CCNC: 72 U/L
ALT SERPL-CCNC: 10 U/L
ANION GAP SERPL CALC-SCNC: 11 MMOL/L
AST SERPL-CCNC: 14 U/L
BASOPHILS # BLD AUTO: 0.03 K/UL
BASOPHILS NFR BLD AUTO: 0.8 %
BILIRUB SERPL-MCNC: 0.3 MG/DL
BUN SERPL-MCNC: 19 MG/DL
CALCIUM SERPL-MCNC: 9.9 MG/DL
CHLORIDE SERPL-SCNC: 106 MMOL/L
CHOLEST SERPL-MCNC: 144 MG/DL
CO2 SERPL-SCNC: 26 MMOL/L
CREAT SERPL-MCNC: 0.68 MG/DL
CRP SERPL HS-MCNC: 0.56 MG/L
EGFR: 90 ML/MIN/1.73M2
EOSINOPHIL # BLD AUTO: 0.08 K/UL
EOSINOPHIL NFR BLD AUTO: 2 %
ESTIMATED AVERAGE GLUCOSE: 100 MG/DL
GLUCOSE SERPL-MCNC: 94 MG/DL
HBA1C MFR BLD HPLC: 5.1 %
HCT VFR BLD CALC: 37.1 %
HDLC SERPL-MCNC: 58 MG/DL
HGB BLD-MCNC: 11.7 G/DL
IMM GRANULOCYTES NFR BLD AUTO: 0.3 %
IRON SERPL-MCNC: 73 UG/DL
LDLC SERPL CALC-MCNC: 69 MG/DL
LYMPHOCYTES # BLD AUTO: 1.39 K/UL
LYMPHOCYTES NFR BLD AUTO: 34.9 %
MAGNESIUM SERPL-MCNC: 2.1 MG/DL
MAN DIFF?: NORMAL
MCHC RBC-ENTMCNC: 31.2 PG
MCHC RBC-ENTMCNC: 31.5 G/DL
MCV RBC AUTO: 98.9 FL
MONOCYTES # BLD AUTO: 0.37 K/UL
MONOCYTES NFR BLD AUTO: 9.3 %
NEUTROPHILS # BLD AUTO: 2.1 K/UL
NEUTROPHILS NFR BLD AUTO: 52.7 %
NONHDLC SERPL-MCNC: 85 MG/DL
PLATELET # BLD AUTO: 293 K/UL
POTASSIUM SERPL-SCNC: 4.5 MMOL/L
PROT SERPL-MCNC: 7.2 G/DL
RBC # BLD: 3.75 M/UL
RBC # FLD: 13.5 %
SODIUM SERPL-SCNC: 143 MMOL/L
T3FREE SERPL-MCNC: 2.83 PG/ML
T4 FREE SERPL-MCNC: 0.8 NG/DL
THYROGLOB AB SERPL-ACNC: 15.3 IU/ML
THYROPEROXIDASE AB SERPL IA-ACNC: <9 IU/ML
TRIGL SERPL-MCNC: 88 MG/DL
TSH SERPL-ACNC: 1.17 UIU/ML
VIT B12 SERPL-MCNC: 1051 PG/ML
WBC # FLD AUTO: 3.98 K/UL

## 2025-02-17 LAB
VIT A SERPL-MCNC: 51.1 UG/DL
VIT B1 SERPL-MCNC: 109.2 NMOL/L

## 2025-02-19 LAB
MENADIONE SERPL-MCNC: 2.97 NG/ML
VIT B6 SERPL-MCNC: 8 UG/L

## 2025-07-25 ENCOUNTER — APPOINTMENT (OUTPATIENT)
Dept: ORTHOPEDIC SURGERY | Facility: CLINIC | Age: 75
End: 2025-07-25
Payer: MEDICARE

## 2025-07-25 PROCEDURE — 99203 OFFICE O/P NEW LOW 30 MIN: CPT

## 2025-07-28 ENCOUNTER — APPOINTMENT (OUTPATIENT)
Dept: ORTHOPEDIC SURGERY | Facility: CLINIC | Age: 75
End: 2025-07-28

## 2025-08-13 ENCOUNTER — APPOINTMENT (OUTPATIENT)
Age: 75
End: 2025-08-13
Payer: MEDICARE

## 2025-08-13 VITALS
BODY MASS INDEX: 31.91 KG/M2 | SYSTOLIC BLOOD PRESSURE: 114 MMHG | TEMPERATURE: 98.1 F | HEIGHT: 61 IN | HEART RATE: 75 BPM | WEIGHT: 169 LBS | RESPIRATION RATE: 16 BRPM | OXYGEN SATURATION: 97 % | DIASTOLIC BLOOD PRESSURE: 76 MMHG

## 2025-08-13 DIAGNOSIS — K76.0 FATTY (CHANGE OF) LIVER, NOT ELSEWHERE CLASSIFIED: ICD-10-CM

## 2025-08-13 DIAGNOSIS — D64.9 ANEMIA, UNSPECIFIED: ICD-10-CM

## 2025-08-13 DIAGNOSIS — K21.00 GASTRO-ESOPHAGEAL REFLUX DISEASE WITH ESOPHAGITIS, WITHOUT BLEEDING: ICD-10-CM

## 2025-08-13 DIAGNOSIS — E66.9 OVERWEIGHT: ICD-10-CM

## 2025-08-13 DIAGNOSIS — E66.3 OVERWEIGHT: ICD-10-CM

## 2025-08-13 DIAGNOSIS — S86.112A STRAIN OF OTHER MUSCLE(S) AND TENDON(S) OF POSTERIOR MUSCLE GROUP AT LOWER LEG LEVEL, LEFT LEG, INITIAL ENCOUNTER: ICD-10-CM

## 2025-08-13 DIAGNOSIS — I10 ESSENTIAL (PRIMARY) HYPERTENSION: ICD-10-CM

## 2025-08-13 DIAGNOSIS — E78.5 HYPERLIPIDEMIA, UNSPECIFIED: ICD-10-CM

## 2025-08-13 LAB
25(OH)D3 SERPL-MCNC: 43.9 NG/ML
ALBUMIN SERPL ELPH-MCNC: 4.5 G/DL
ALP BLD-CCNC: 70 U/L
ALT SERPL-CCNC: 11 U/L
ANION GAP SERPL CALC-SCNC: 15 MMOL/L
AST SERPL-CCNC: 13 U/L
BASOPHILS # BLD AUTO: 0.05 K/UL
BASOPHILS NFR BLD AUTO: 1.2 %
BILIRUB SERPL-MCNC: 0.4 MG/DL
BUN SERPL-MCNC: 16 MG/DL
CALCIUM SERPL-MCNC: 9.9 MG/DL
CHLORIDE SERPL-SCNC: 107 MMOL/L
CHOLEST SERPL-MCNC: 133 MG/DL
CO2 SERPL-SCNC: 22 MMOL/L
CREAT SERPL-MCNC: 0.68 MG/DL
CRP SERPL HS-MCNC: 1.43 MG/L
CRP SERPL-MCNC: <3 MG/L
EGFRCR SERPLBLD CKD-EPI 2021: 90 ML/MIN/1.73M2
EOSINOPHIL # BLD AUTO: 0.16 K/UL
EOSINOPHIL NFR BLD AUTO: 3.7 %
FERRITIN SERPL-MCNC: 26 NG/ML
GLUCOSE SERPL-MCNC: 91 MG/DL
HCT VFR BLD CALC: 36.5 %
HDLC SERPL-MCNC: 52 MG/DL
HGB BLD-MCNC: 11.1 G/DL
IMM GRANULOCYTES NFR BLD AUTO: 0.2 %
IRON SATN MFR SERPL: 18 %
IRON SERPL-MCNC: 59 UG/DL
LDLC SERPL-MCNC: 60 MG/DL
LYMPHOCYTES # BLD AUTO: 1.16 K/UL
LYMPHOCYTES NFR BLD AUTO: 27.2 %
MAGNESIUM SERPL-MCNC: 2.1 MG/DL
MAN DIFF?: NORMAL
MCHC RBC-ENTMCNC: 29.6 PG
MCHC RBC-ENTMCNC: 30.4 G/DL
MCV RBC AUTO: 97.3 FL
MONOCYTES # BLD AUTO: 0.44 K/UL
MONOCYTES NFR BLD AUTO: 10.3 %
NEUTROPHILS # BLD AUTO: 2.45 K/UL
NEUTROPHILS NFR BLD AUTO: 57.4 %
NONHDLC SERPL-MCNC: 81 MG/DL
PLATELET # BLD AUTO: 337 K/UL
POTASSIUM SERPL-SCNC: 4.4 MMOL/L
PROT SERPL-MCNC: 7.4 G/DL
RBC # BLD: 3.75 M/UL
RBC # FLD: 14.2 %
SODIUM SERPL-SCNC: 143 MMOL/L
T3FREE SERPL-MCNC: 2.94 PG/ML
T4 FREE SERPL-MCNC: 1 NG/DL
THYROGLOB AB SERPL-ACNC: 15.9 IU/ML
THYROPEROXIDASE AB SERPL IA-ACNC: <9 IU/ML
TIBC SERPL-MCNC: 329 UG/DL
TRIGL SERPL-MCNC: 116 MG/DL
TSH SERPL-ACNC: 1.4 UIU/ML
UIBC SERPL-MCNC: 270 UG/DL
VIT B12 SERPL-MCNC: 1293 PG/ML
WBC # FLD AUTO: 4.27 K/UL

## 2025-08-13 PROCEDURE — 36415 COLL VENOUS BLD VENIPUNCTURE: CPT

## 2025-08-13 PROCEDURE — G2211 COMPLEX E/M VISIT ADD ON: CPT

## 2025-08-13 PROCEDURE — 99214 OFFICE O/P EST MOD 30 MIN: CPT

## 2025-08-14 PROBLEM — S86.112A RUPTURE OF LEFT GASTROCNEMIUS TENDON, INITIAL ENCOUNTER: Status: ACTIVE | Noted: 2025-07-25

## 2025-08-14 LAB
ESTIMATED AVERAGE GLUCOSE: 100 MG/DL
HBA1C MFR BLD HPLC: 5.1 %

## 2025-08-17 LAB
VIT A SERPL-MCNC: 40.5 UG/DL
VIT B1 SERPL-MCNC: 98 NMOL/L

## 2025-08-20 LAB — VIT C SERPL-MCNC: 1 MG/DL
